# Patient Record
Sex: FEMALE | Race: WHITE | Employment: OTHER | URBAN - NONMETROPOLITAN AREA
[De-identification: names, ages, dates, MRNs, and addresses within clinical notes are randomized per-mention and may not be internally consistent; named-entity substitution may affect disease eponyms.]

---

## 2017-01-07 ENCOUNTER — OFFICE VISIT (OUTPATIENT)
Dept: FAMILY MEDICINE CLINIC | Age: 77
End: 2017-01-07

## 2017-01-07 VITALS
BODY MASS INDEX: 22.61 KG/M2 | DIASTOLIC BLOOD PRESSURE: 60 MMHG | HEART RATE: 78 BPM | SYSTOLIC BLOOD PRESSURE: 118 MMHG | WEIGHT: 127.6 LBS | OXYGEN SATURATION: 98 % | TEMPERATURE: 98.3 F | HEIGHT: 63 IN

## 2017-01-07 DIAGNOSIS — R51.9 ACUTE NONINTRACTABLE HEADACHE, UNSPECIFIED HEADACHE TYPE: ICD-10-CM

## 2017-01-07 DIAGNOSIS — Z00.00 WELLNESS EXAMINATION: ICD-10-CM

## 2017-01-07 DIAGNOSIS — R42 VERTIGO: ICD-10-CM

## 2017-01-07 DIAGNOSIS — R41.3 MEMORY LOSS: ICD-10-CM

## 2017-01-07 DIAGNOSIS — R47.89 WORD FINDING DIFFICULTY: ICD-10-CM

## 2017-01-07 DIAGNOSIS — J18.9 PNEUMONIA DUE TO INFECTIOUS ORGANISM, UNSPECIFIED LATERALITY, UNSPECIFIED PART OF LUNG: Primary | ICD-10-CM

## 2017-01-07 PROCEDURE — 99214 OFFICE O/P EST MOD 30 MIN: CPT | Performed by: FAMILY MEDICINE

## 2017-01-07 RX ORDER — DOXYCYCLINE HYCLATE 100 MG
100 TABLET ORAL 2 TIMES DAILY
Qty: 28 TABLET | Refills: 0 | Status: SHIPPED | OUTPATIENT
Start: 2017-01-07 | End: 2017-01-21

## 2017-01-07 ASSESSMENT — ENCOUNTER SYMPTOMS
SORE THROAT: 0
SHORTNESS OF BREATH: 0
RHINORRHEA: 1
COUGH: 1

## 2017-01-19 DIAGNOSIS — Z00.00 WELLNESS EXAMINATION: ICD-10-CM

## 2017-01-19 DIAGNOSIS — R41.3 MEMORY LOSS: ICD-10-CM

## 2017-01-19 LAB
ALT SERPL-CCNC: 11 U/L (ref 0–33)
ANION GAP SERPL CALCULATED.3IONS-SCNC: 14 MEQ/L (ref 7–13)
AST SERPL-CCNC: 20 U/L (ref 0–35)
BASOPHILS ABSOLUTE: 0.1 K/UL (ref 0–0.2)
BASOPHILS RELATIVE PERCENT: 1.4 %
BUN BLDV-MCNC: 14 MG/DL (ref 8–23)
CALCIUM SERPL-MCNC: 9.5 MG/DL (ref 8.6–10.2)
CHLORIDE BLD-SCNC: 100 MEQ/L (ref 98–107)
CHOLESTEROL, TOTAL: 186 MG/DL (ref 0–199)
CO2: 23 MEQ/L (ref 22–29)
CREAT SERPL-MCNC: 0.64 MG/DL (ref 0.5–0.9)
EOSINOPHILS ABSOLUTE: 0.1 K/UL (ref 0–0.7)
EOSINOPHILS RELATIVE PERCENT: 2.3 %
FOLATE: 16.3 NG/ML (ref 7.3–26.1)
GFR AFRICAN AMERICAN: >60
GFR NON-AFRICAN AMERICAN: >60
GLUCOSE BLD-MCNC: 93 MG/DL (ref 74–109)
HCT VFR BLD CALC: 38.3 % (ref 37–47)
HDLC SERPL-MCNC: 76 MG/DL (ref 40–59)
HEMOGLOBIN: 12.3 G/DL (ref 12–16)
LDL CHOLESTEROL CALCULATED: 91 MG/DL (ref 0–129)
LYMPHOCYTES ABSOLUTE: 1 K/UL (ref 1–4.8)
LYMPHOCYTES RELATIVE PERCENT: 20.3 %
MCH RBC QN AUTO: 28.8 PG (ref 27–31.3)
MCHC RBC AUTO-ENTMCNC: 32 % (ref 33–37)
MCV RBC AUTO: 90 FL (ref 82–100)
MONOCYTES ABSOLUTE: 0.7 K/UL (ref 0.2–0.8)
MONOCYTES RELATIVE PERCENT: 14.6 %
NEUTROPHILS ABSOLUTE: 3 K/UL (ref 1.4–6.5)
NEUTROPHILS RELATIVE PERCENT: 61.4 %
PDW BLD-RTO: 13.9 % (ref 11.5–14.5)
PLATELET # BLD: 292 K/UL (ref 130–400)
POTASSIUM SERPL-SCNC: 4.5 MEQ/L (ref 3.5–5.1)
RBC # BLD: 4.26 M/UL (ref 4.2–5.4)
SODIUM BLD-SCNC: 137 MEQ/L (ref 132–144)
TRIGL SERPL-MCNC: 94 MG/DL (ref 0–200)
TSH SERPL DL<=0.05 MIU/L-ACNC: 0.48 UIU/ML (ref 0.27–4.2)
VITAMIN B-12: 485 PG/ML (ref 211–946)
WBC # BLD: 4.9 K/UL (ref 4.8–10.8)

## 2017-01-20 ENCOUNTER — OFFICE VISIT (OUTPATIENT)
Dept: FAMILY MEDICINE CLINIC | Age: 77
End: 2017-01-20

## 2017-01-20 VITALS
DIASTOLIC BLOOD PRESSURE: 68 MMHG | HEIGHT: 63 IN | BODY MASS INDEX: 22.5 KG/M2 | SYSTOLIC BLOOD PRESSURE: 110 MMHG | HEART RATE: 73 BPM | OXYGEN SATURATION: 98 % | WEIGHT: 127 LBS

## 2017-01-20 DIAGNOSIS — M06.9 RHEUMATOID ARTHRITIS, INVOLVING UNSPECIFIED SITE, UNSPECIFIED RHEUMATOID FACTOR PRESENCE: ICD-10-CM

## 2017-01-20 DIAGNOSIS — I48.91 ATRIAL FIBRILLATION, UNSPECIFIED TYPE (HCC): ICD-10-CM

## 2017-01-20 DIAGNOSIS — E03.9 HYPOTHYROIDISM, UNSPECIFIED TYPE: Primary | ICD-10-CM

## 2017-01-20 LAB
RPR: NORMAL
T4 FREE: 1.09 NG/DL (ref 0.93–1.7)

## 2017-01-20 PROCEDURE — 1090F PRES/ABSN URINE INCON ASSESS: CPT | Performed by: FAMILY MEDICINE

## 2017-01-20 PROCEDURE — G8427 DOCREV CUR MEDS BY ELIG CLIN: HCPCS | Performed by: FAMILY MEDICINE

## 2017-01-20 PROCEDURE — G8484 FLU IMMUNIZE NO ADMIN: HCPCS | Performed by: FAMILY MEDICINE

## 2017-01-20 PROCEDURE — G8419 CALC BMI OUT NRM PARAM NOF/U: HCPCS | Performed by: FAMILY MEDICINE

## 2017-01-20 PROCEDURE — G8399 PT W/DXA RESULTS DOCUMENT: HCPCS | Performed by: FAMILY MEDICINE

## 2017-01-20 PROCEDURE — 1123F ACP DISCUSS/DSCN MKR DOCD: CPT | Performed by: FAMILY MEDICINE

## 2017-01-20 PROCEDURE — 1036F TOBACCO NON-USER: CPT | Performed by: FAMILY MEDICINE

## 2017-01-20 PROCEDURE — 99213 OFFICE O/P EST LOW 20 MIN: CPT | Performed by: FAMILY MEDICINE

## 2017-01-20 PROCEDURE — 4040F PNEUMOC VAC/ADMIN/RCVD: CPT | Performed by: FAMILY MEDICINE

## 2018-03-30 ENCOUNTER — OFFICE VISIT (OUTPATIENT)
Dept: FAMILY MEDICINE CLINIC | Age: 78
End: 2018-03-30
Payer: MEDICARE

## 2018-03-30 VITALS
HEART RATE: 69 BPM | WEIGHT: 127 LBS | BODY MASS INDEX: 22.5 KG/M2 | OXYGEN SATURATION: 96 % | HEIGHT: 63 IN | SYSTOLIC BLOOD PRESSURE: 116 MMHG | DIASTOLIC BLOOD PRESSURE: 74 MMHG

## 2018-03-30 DIAGNOSIS — M81.0 OSTEOPOROSIS, UNSPECIFIED OSTEOPOROSIS TYPE, UNSPECIFIED PATHOLOGICAL FRACTURE PRESENCE: ICD-10-CM

## 2018-03-30 DIAGNOSIS — E03.9 HYPOTHYROIDISM, UNSPECIFIED TYPE: ICD-10-CM

## 2018-03-30 DIAGNOSIS — M06.9 RHEUMATOID ARTHRITIS, INVOLVING UNSPECIFIED SITE, UNSPECIFIED RHEUMATOID FACTOR PRESENCE: Primary | ICD-10-CM

## 2018-03-30 DIAGNOSIS — I48.91 ATRIAL FIBRILLATION, UNSPECIFIED TYPE (HCC): ICD-10-CM

## 2018-03-30 DIAGNOSIS — Z12.31 ENCOUNTER FOR SCREENING MAMMOGRAM FOR MALIGNANT NEOPLASM OF BREAST: ICD-10-CM

## 2018-03-30 LAB
ALBUMIN SERPL-MCNC: 4.3 G/DL (ref 3.9–4.9)
ALP BLD-CCNC: 68 U/L (ref 40–130)
ALT SERPL-CCNC: 22 U/L (ref 0–33)
ANION GAP SERPL CALCULATED.3IONS-SCNC: 12 MEQ/L (ref 7–13)
AST SERPL-CCNC: 28 U/L (ref 0–35)
BILIRUB SERPL-MCNC: 0.7 MG/DL (ref 0–1.2)
BUN BLDV-MCNC: 12 MG/DL (ref 8–23)
CALCIUM SERPL-MCNC: 9.4 MG/DL (ref 8.6–10.2)
CHLORIDE BLD-SCNC: 100 MEQ/L (ref 98–107)
CHOLESTEROL, TOTAL: 197 MG/DL (ref 0–199)
CO2: 25 MEQ/L (ref 22–29)
CREAT SERPL-MCNC: 0.69 MG/DL (ref 0.5–0.9)
GFR AFRICAN AMERICAN: >60
GFR NON-AFRICAN AMERICAN: >60
GLOBULIN: 3.1 G/DL (ref 2.3–3.5)
GLUCOSE BLD-MCNC: 87 MG/DL (ref 74–109)
HCT VFR BLD CALC: 37.1 % (ref 37–47)
HDLC SERPL-MCNC: 75 MG/DL (ref 40–59)
HEMOGLOBIN: 12.3 G/DL (ref 12–16)
LDL CHOLESTEROL CALCULATED: 108 MG/DL (ref 0–129)
MCH RBC QN AUTO: 30.2 PG (ref 27–31.3)
MCHC RBC AUTO-ENTMCNC: 33 % (ref 33–37)
MCV RBC AUTO: 91.3 FL (ref 82–100)
PDW BLD-RTO: 14 % (ref 11.5–14.5)
PLATELET # BLD: 239 K/UL (ref 130–400)
POTASSIUM SERPL-SCNC: 4.3 MEQ/L (ref 3.5–5.1)
RBC # BLD: 4.06 M/UL (ref 4.2–5.4)
SODIUM BLD-SCNC: 137 MEQ/L (ref 132–144)
TOTAL PROTEIN: 7.4 G/DL (ref 6.4–8.1)
TRIGL SERPL-MCNC: 70 MG/DL (ref 0–200)
TSH SERPL DL<=0.05 MIU/L-ACNC: 2.12 UIU/ML (ref 0.27–4.2)
VITAMIN D 25-HYDROXY: 30.6 NG/ML (ref 30–100)
WBC # BLD: 5.5 K/UL (ref 4.8–10.8)

## 2018-03-30 PROCEDURE — 3288F FALL RISK ASSESSMENT DOCD: CPT | Performed by: FAMILY MEDICINE

## 2018-03-30 PROCEDURE — 1036F TOBACCO NON-USER: CPT | Performed by: FAMILY MEDICINE

## 2018-03-30 PROCEDURE — 4040F PNEUMOC VAC/ADMIN/RCVD: CPT | Performed by: FAMILY MEDICINE

## 2018-03-30 PROCEDURE — G8420 CALC BMI NORM PARAMETERS: HCPCS | Performed by: FAMILY MEDICINE

## 2018-03-30 PROCEDURE — 99213 OFFICE O/P EST LOW 20 MIN: CPT | Performed by: FAMILY MEDICINE

## 2018-03-30 PROCEDURE — G8510 SCR DEP NEG, NO PLAN REQD: HCPCS | Performed by: FAMILY MEDICINE

## 2018-03-30 PROCEDURE — G8399 PT W/DXA RESULTS DOCUMENT: HCPCS | Performed by: FAMILY MEDICINE

## 2018-03-30 PROCEDURE — 1123F ACP DISCUSS/DSCN MKR DOCD: CPT | Performed by: FAMILY MEDICINE

## 2018-03-30 PROCEDURE — 1090F PRES/ABSN URINE INCON ASSESS: CPT | Performed by: FAMILY MEDICINE

## 2018-03-30 PROCEDURE — G8484 FLU IMMUNIZE NO ADMIN: HCPCS | Performed by: FAMILY MEDICINE

## 2018-03-30 PROCEDURE — G8427 DOCREV CUR MEDS BY ELIG CLIN: HCPCS | Performed by: FAMILY MEDICINE

## 2018-03-30 ASSESSMENT — PATIENT HEALTH QUESTIONNAIRE - PHQ9
SUM OF ALL RESPONSES TO PHQ9 QUESTIONS 1 & 2: 0
SUM OF ALL RESPONSES TO PHQ QUESTIONS 1-9: 0
2. FEELING DOWN, DEPRESSED OR HOPELESS: 0
1. LITTLE INTEREST OR PLEASURE IN DOING THINGS: 0

## 2018-04-17 NOTE — PROGRESS NOTES
Left message for patient regarding the outstanding orders
Heart: RRR, S1S2, w/out M/R/G, non-displaced PMI   Abdomen: Soft NT/ND, w/out R/G, w/ +BSx4   Ext: trace to 1+ pedal edema bilaterally. Neuro: Neurovascularly intact w/ Sensory/Motor intact UE/LE Bilaterally. Psych: she makes good eye contact, but flat affect, quiet. A&P  1. Rheumatoid arthritis, involving unspecified site, unspecified rheumatoid factor presence ([facility] [address]     2. Hypothyroidism, unspecified type  Lipid Panel    TSH without Reflex   3. Atrial fibrillation, unspecified type ([address]  CBC    Lipid Panel    Comprehensive Metabolic Panel   4. Osteoporosis, unspecified osteoporosis type, unspecified pathological fracture presence  Vitamin D 25 Hydroxy    DEXA Bone Density Axial Skeleton   5. Encounter for screening mammogram for malignant neoplasm of breast  KATHLEEN DIGITAL SCREEN W CAD BILATERAL     Labs reviewed in detail     F/u 6 mos, sooner if any symptoms worsening. Reviewed [facility] note  Remains only on asa for PAF, but she remains in [facility]    Updated health maintenance.         Fortino Amaro MD

## 2018-06-07 ENCOUNTER — TELEPHONE (OUTPATIENT)
Dept: FAMILY MEDICINE CLINIC | Age: 78
End: 2018-06-07

## 2018-06-08 ENCOUNTER — OFFICE VISIT (OUTPATIENT)
Dept: FAMILY MEDICINE CLINIC | Age: 78
End: 2018-06-08
Payer: MEDICARE

## 2018-06-08 VITALS
SYSTOLIC BLOOD PRESSURE: 110 MMHG | HEIGHT: 63 IN | HEART RATE: 67 BPM | DIASTOLIC BLOOD PRESSURE: 78 MMHG | OXYGEN SATURATION: 98 %

## 2018-06-08 DIAGNOSIS — M06.9 RHEUMATOID ARTHRITIS, INVOLVING UNSPECIFIED SITE, UNSPECIFIED RHEUMATOID FACTOR PRESENCE: ICD-10-CM

## 2018-06-08 DIAGNOSIS — I48.91 ATRIAL FIBRILLATION, UNSPECIFIED TYPE (HCC): ICD-10-CM

## 2018-06-08 DIAGNOSIS — R41.3 MEMORY LOSS: Primary | ICD-10-CM

## 2018-06-08 DIAGNOSIS — S72.8X1A OTHER FRACTURE OF RIGHT FEMUR, INITIAL ENCOUNTER FOR CLOSED FRACTURE (HCC): ICD-10-CM

## 2018-06-08 DIAGNOSIS — F32.A DEPRESSION, UNSPECIFIED DEPRESSION TYPE: ICD-10-CM

## 2018-06-08 PROCEDURE — G8420 CALC BMI NORM PARAMETERS: HCPCS | Performed by: FAMILY MEDICINE

## 2018-06-08 PROCEDURE — G8399 PT W/DXA RESULTS DOCUMENT: HCPCS | Performed by: FAMILY MEDICINE

## 2018-06-08 PROCEDURE — 1090F PRES/ABSN URINE INCON ASSESS: CPT | Performed by: FAMILY MEDICINE

## 2018-06-08 PROCEDURE — 4040F PNEUMOC VAC/ADMIN/RCVD: CPT | Performed by: FAMILY MEDICINE

## 2018-06-08 PROCEDURE — 1123F ACP DISCUSS/DSCN MKR DOCD: CPT | Performed by: FAMILY MEDICINE

## 2018-06-08 PROCEDURE — 99213 OFFICE O/P EST LOW 20 MIN: CPT | Performed by: FAMILY MEDICINE

## 2018-06-08 PROCEDURE — G8427 DOCREV CUR MEDS BY ELIG CLIN: HCPCS | Performed by: FAMILY MEDICINE

## 2018-06-08 PROCEDURE — 1036F TOBACCO NON-USER: CPT | Performed by: FAMILY MEDICINE

## 2018-06-08 RX ORDER — METOPROLOL SUCCINATE 25 MG/1
25 TABLET, EXTENDED RELEASE ORAL
COMMUNITY
Start: 2018-06-04 | End: 2019-07-11

## 2018-06-08 RX ORDER — DONEPEZIL HYDROCHLORIDE 5 MG/1
5 TABLET, FILM COATED ORAL NIGHTLY
Qty: 30 TABLET | Refills: 2 | Status: SHIPPED | OUTPATIENT
Start: 2018-06-08 | End: 2018-07-06 | Stop reason: SDUPTHER

## 2018-06-12 ENCOUNTER — HOSPITAL ENCOUNTER (OUTPATIENT)
Dept: ORTHOPEDIC SURGERY | Age: 78
Discharge: HOME | End: 2018-06-14
Payer: MEDICARE

## 2018-06-12 ENCOUNTER — TELEPHONE (OUTPATIENT)
Dept: FAMILY MEDICINE CLINIC | Age: 78
End: 2018-06-12

## 2018-06-12 DIAGNOSIS — M25.551 PAIN IN JOINT INVOLVING RIGHT PELVIC REGION AND THIGH: ICD-10-CM

## 2018-06-12 PROCEDURE — 73502 X-RAY EXAM HIP UNI 2-3 VIEWS: CPT

## 2018-07-03 ENCOUNTER — HOSPITAL ENCOUNTER (OUTPATIENT)
Dept: ORTHOPEDIC SURGERY | Age: 78
Discharge: HOME | End: 2018-07-05
Payer: MEDICARE

## 2018-07-03 DIAGNOSIS — M25.551 PAIN IN JOINT INVOLVING RIGHT PELVIC REGION AND THIGH: ICD-10-CM

## 2018-07-03 PROCEDURE — 73502 X-RAY EXAM HIP UNI 2-3 VIEWS: CPT

## 2018-07-06 ENCOUNTER — OFFICE VISIT (OUTPATIENT)
Dept: FAMILY MEDICINE CLINIC | Age: 78
End: 2018-07-06
Payer: MEDICARE

## 2018-07-06 VITALS
OXYGEN SATURATION: 98 % | HEIGHT: 63 IN | BODY MASS INDEX: 23.57 KG/M2 | WEIGHT: 133 LBS | DIASTOLIC BLOOD PRESSURE: 78 MMHG | SYSTOLIC BLOOD PRESSURE: 124 MMHG | HEART RATE: 56 BPM

## 2018-07-06 DIAGNOSIS — M06.9 RHEUMATOID ARTHRITIS, INVOLVING UNSPECIFIED SITE, UNSPECIFIED RHEUMATOID FACTOR PRESENCE: ICD-10-CM

## 2018-07-06 DIAGNOSIS — F32.A DEPRESSION, UNSPECIFIED DEPRESSION TYPE: ICD-10-CM

## 2018-07-06 DIAGNOSIS — I48.91 ATRIAL FIBRILLATION, UNSPECIFIED TYPE (HCC): ICD-10-CM

## 2018-07-06 DIAGNOSIS — R41.3 MEMORY LOSS: Primary | ICD-10-CM

## 2018-07-06 PROCEDURE — 1090F PRES/ABSN URINE INCON ASSESS: CPT | Performed by: FAMILY MEDICINE

## 2018-07-06 PROCEDURE — 1123F ACP DISCUSS/DSCN MKR DOCD: CPT | Performed by: FAMILY MEDICINE

## 2018-07-06 PROCEDURE — G8427 DOCREV CUR MEDS BY ELIG CLIN: HCPCS | Performed by: FAMILY MEDICINE

## 2018-07-06 PROCEDURE — 1036F TOBACCO NON-USER: CPT | Performed by: FAMILY MEDICINE

## 2018-07-06 PROCEDURE — G8420 CALC BMI NORM PARAMETERS: HCPCS | Performed by: FAMILY MEDICINE

## 2018-07-06 PROCEDURE — 99212 OFFICE O/P EST SF 10 MIN: CPT | Performed by: FAMILY MEDICINE

## 2018-07-06 PROCEDURE — 4040F PNEUMOC VAC/ADMIN/RCVD: CPT | Performed by: FAMILY MEDICINE

## 2018-07-06 PROCEDURE — G8399 PT W/DXA RESULTS DOCUMENT: HCPCS | Performed by: FAMILY MEDICINE

## 2018-07-06 RX ORDER — DONEPEZIL HYDROCHLORIDE 10 MG/1
10 TABLET, FILM COATED ORAL NIGHTLY
Qty: 30 TABLET | Refills: 5 | Status: SHIPPED | OUTPATIENT
Start: 2018-07-06 | End: 2019-01-11 | Stop reason: SINTOL

## 2018-07-06 NOTE — PROGRESS NOTES
Chief Complaint   Patient presents with    Medication Check     aricept     Alexa Milligan is a [de-identified] y.o. female    F/u aricept  Seems to be helping  No negative side effecs    s/p ORIF  2 weeks in rehab hospital  Home now since 6/4  home care nurse, OT, PT          Patient Active Problem List   Diagnosis    Hypothyroidism    RA (rheumatoid arthritis) ([facility] [address]    History of shingles    Hearing loss    Dependent edema    Post-nasal drip    Weight loss    Depression    Poor appetite    A-fib ([facility] [address]    Osteoporosis       has a current medication list which includes the following prescription(s): donepezil, metoprolol succinate, aspirin, and flecainide. Patient's medications, allergies, past medical, surgical, social and family histories were reviewed and updated as appropriate. Review of Systems:   General/Endocrine ROS: fatigue, dry hair and skin, hair loss  Psych: see HPI  Respiratory ROS: no cough, shortness of breath, or wheezing  Cardiovascular ROS: no chest pain or dyspnea on exertion  Gastrointestinal ROS: no abdominal pain, change in bowel habits, or black or bloody stools  Genito-Urinary ROS: no dysuria, trouble voiding  Musculoskeletal ROS:  Recent hip fracture, hx RA on no DMARD,   DENIES PAIN  Neurological ROS:memory loss    In general patient otherwise reports feeling well. Physical Exam:  /78 (Site: Left Arm)   Pulse 56   Ht 5' 3\" (1.6 m)   Wt 133 lb (60.3 kg)   SpO2 98%   Breastfeeding? No   BMI 23.56 kg/m²     Gen: thin, but well appearing, NAD, Alert, Oriented x 3   HEENT: EOMI, eyes clear, MMM  Skin: without rash or jaundice  Neuro: Neurovascularly intact w/ Sensory/Motor intact UE/LE Bilaterally. Psych: she makes good eye contact, but flat affect, quiet. A&P   Diagnosis Orders   1. Memory loss  donepezil (ARICEPT) 10 MG tablet   2. Rheumatoid arthritis, involving unspecified site, unspecified rheumatoid factor presence ([facility] [address]     3.  Atrial fibrillation,

## 2018-08-03 ENCOUNTER — HOSPITAL ENCOUNTER (OUTPATIENT)
Dept: ORTHOPEDIC SURGERY | Age: 78
Discharge: HOME | End: 2018-08-05
Payer: MEDICARE

## 2018-08-03 DIAGNOSIS — S72.001D CLOSED FRACTURE OF NECK OF RIGHT FEMUR WITH ROUTINE HEALING, SUBSEQUENT ENCOUNTER: ICD-10-CM

## 2018-08-03 PROCEDURE — 73502 X-RAY EXAM HIP UNI 2-3 VIEWS: CPT

## 2018-09-26 ENCOUNTER — TELEPHONE (OUTPATIENT)
Dept: FAMILY MEDICINE CLINIC | Age: 78
End: 2018-09-26

## 2018-09-27 ENCOUNTER — TELEPHONE (OUTPATIENT)
Dept: FAMILY MEDICINE CLINIC | Age: 78
End: 2018-09-27

## 2018-09-27 NOTE — TELEPHONE ENCOUNTER
Virgilio from [address] [place], [place] [address]y [address]; Shahnaz Erickson is not available. Please call him back.  [phone]

## 2018-09-27 NOTE — TELEPHONE ENCOUNTER
Warden Bang states does not have titration pack, per Dr. Deidra Leung 7 mg x 2 weeks then 14 mg x 2 week, then follow up and 14mg until pt able to come in.   Virgilio and pt aware

## 2018-09-27 NOTE — TELEPHONE ENCOUNTER
Orders Placed This Encounter   Medications    Memantine HCl ER (NAMENDA XR TITRATION PACK) 7 & 14 & 21 &28 MG CP24     Sig: Take as directed     Dispense:  30 capsule     Refill:  5       The above med(s) were e-scripted to the patient's pharmacy.    Please advise patient  José Cotton MD

## 2018-10-29 RX ORDER — MEMANTINE HYDROCHLORIDE 14 MG/1
CAPSULE, EXTENDED RELEASE ORAL
Qty: 30 CAPSULE | Refills: 0 | Status: SHIPPED | OUTPATIENT
Start: 2018-10-29 | End: 2018-11-27 | Stop reason: SDUPTHER

## 2019-01-11 ENCOUNTER — OFFICE VISIT (OUTPATIENT)
Dept: FAMILY MEDICINE CLINIC | Age: 79
End: 2019-01-11
Payer: MEDICARE

## 2019-01-11 VITALS
WEIGHT: 138 LBS | HEART RATE: 64 BPM | DIASTOLIC BLOOD PRESSURE: 60 MMHG | BODY MASS INDEX: 24.45 KG/M2 | HEIGHT: 63 IN | OXYGEN SATURATION: 99 % | SYSTOLIC BLOOD PRESSURE: 110 MMHG

## 2019-01-11 DIAGNOSIS — M06.9 RHEUMATOID ARTHRITIS, INVOLVING UNSPECIFIED SITE, UNSPECIFIED RHEUMATOID FACTOR PRESENCE: ICD-10-CM

## 2019-01-11 DIAGNOSIS — E03.9 HYPOTHYROIDISM, UNSPECIFIED TYPE: ICD-10-CM

## 2019-01-11 DIAGNOSIS — I48.91 ATRIAL FIBRILLATION, UNSPECIFIED TYPE (HCC): Primary | ICD-10-CM

## 2019-01-11 DIAGNOSIS — M81.0 OSTEOPOROSIS, UNSPECIFIED OSTEOPOROSIS TYPE, UNSPECIFIED PATHOLOGICAL FRACTURE PRESENCE: ICD-10-CM

## 2019-01-11 DIAGNOSIS — Z12.39 SCREENING FOR MALIGNANT NEOPLASM OF BREAST: ICD-10-CM

## 2019-01-11 DIAGNOSIS — F32.A DEPRESSION, UNSPECIFIED DEPRESSION TYPE: ICD-10-CM

## 2019-01-11 DIAGNOSIS — R41.3 MEMORY LOSS: ICD-10-CM

## 2019-01-11 PROCEDURE — 1123F ACP DISCUSS/DSCN MKR DOCD: CPT | Performed by: FAMILY MEDICINE

## 2019-01-11 PROCEDURE — 1036F TOBACCO NON-USER: CPT | Performed by: FAMILY MEDICINE

## 2019-01-11 PROCEDURE — 99213 OFFICE O/P EST LOW 20 MIN: CPT | Performed by: FAMILY MEDICINE

## 2019-01-11 PROCEDURE — G8399 PT W/DXA RESULTS DOCUMENT: HCPCS | Performed by: FAMILY MEDICINE

## 2019-01-11 PROCEDURE — 4040F PNEUMOC VAC/ADMIN/RCVD: CPT | Performed by: FAMILY MEDICINE

## 2019-01-11 PROCEDURE — G8482 FLU IMMUNIZE ORDER/ADMIN: HCPCS | Performed by: FAMILY MEDICINE

## 2019-01-11 PROCEDURE — 1101F PT FALLS ASSESS-DOCD LE1/YR: CPT | Performed by: FAMILY MEDICINE

## 2019-01-11 PROCEDURE — G8420 CALC BMI NORM PARAMETERS: HCPCS | Performed by: FAMILY MEDICINE

## 2019-01-11 PROCEDURE — 1090F PRES/ABSN URINE INCON ASSESS: CPT | Performed by: FAMILY MEDICINE

## 2019-01-11 PROCEDURE — G8427 DOCREV CUR MEDS BY ELIG CLIN: HCPCS | Performed by: FAMILY MEDICINE

## 2019-01-11 RX ORDER — LANOLIN ALCOHOL/MO/W.PET/CERES
1000 CREAM (GRAM) TOPICAL DAILY
COMMUNITY
End: 2021-01-01

## 2019-01-11 RX ORDER — PSEUDOEPHEDRINE HCL 30 MG
100 TABLET ORAL
COMMUNITY
Start: 2018-06-04 | End: 2019-07-11 | Stop reason: ALTCHOICE

## 2019-01-11 RX ORDER — B-COMPLEX WITH VITAMIN C
1 TABLET ORAL
COMMUNITY
Start: 2018-06-04

## 2019-02-08 DIAGNOSIS — M81.0 OSTEOPOROSIS, UNSPECIFIED OSTEOPOROSIS TYPE, UNSPECIFIED PATHOLOGICAL FRACTURE PRESENCE: ICD-10-CM

## 2019-02-08 DIAGNOSIS — E03.9 HYPOTHYROIDISM, UNSPECIFIED TYPE: ICD-10-CM

## 2019-02-08 DIAGNOSIS — I48.91 ATRIAL FIBRILLATION, UNSPECIFIED TYPE (HCC): ICD-10-CM

## 2019-02-08 LAB
ALBUMIN SERPL-MCNC: 4.4 G/DL (ref 3.5–4.6)
ALP BLD-CCNC: 66 U/L (ref 40–130)
ALT SERPL-CCNC: 16 U/L (ref 0–33)
ANION GAP SERPL CALCULATED.3IONS-SCNC: 14 MEQ/L (ref 9–15)
AST SERPL-CCNC: 22 U/L (ref 0–35)
BILIRUB SERPL-MCNC: 0.8 MG/DL (ref 0.2–0.7)
BUN BLDV-MCNC: 19 MG/DL (ref 8–23)
CALCIUM SERPL-MCNC: 9.8 MG/DL (ref 8.5–9.9)
CHLORIDE BLD-SCNC: 101 MEQ/L (ref 95–107)
CHOLESTEROL, TOTAL: 189 MG/DL (ref 0–199)
CO2: 26 MEQ/L (ref 20–31)
CREAT SERPL-MCNC: 0.74 MG/DL (ref 0.5–0.9)
GFR AFRICAN AMERICAN: >60
GFR NON-AFRICAN AMERICAN: >60
GLOBULIN: 3.5 G/DL (ref 2.3–3.5)
GLUCOSE BLD-MCNC: 72 MG/DL (ref 70–99)
HCT VFR BLD CALC: 38.3 % (ref 37–47)
HDLC SERPL-MCNC: 66 MG/DL (ref 40–59)
HEMOGLOBIN: 12.9 G/DL (ref 12–16)
LDL CHOLESTEROL CALCULATED: 108 MG/DL (ref 0–129)
MCH RBC QN AUTO: 30.6 PG (ref 27–31.3)
MCHC RBC AUTO-ENTMCNC: 33.7 % (ref 33–37)
MCV RBC AUTO: 90.8 FL (ref 82–100)
PDW BLD-RTO: 13.5 % (ref 11.5–14.5)
PLATELET # BLD: 250 K/UL (ref 130–400)
POTASSIUM SERPL-SCNC: 4.7 MEQ/L (ref 3.4–4.9)
RBC # BLD: 4.22 M/UL (ref 4.2–5.4)
SODIUM BLD-SCNC: 141 MEQ/L (ref 135–144)
TOTAL PROTEIN: 7.9 G/DL (ref 6.3–8)
TRIGL SERPL-MCNC: 74 MG/DL (ref 0–150)
TSH SERPL DL<=0.05 MIU/L-ACNC: 1.5 UIU/ML (ref 0.44–3.86)
VITAMIN D 25-HYDROXY: 28.9 NG/ML (ref 30–100)
WBC # BLD: 4.9 K/UL (ref 4.8–10.8)

## 2019-05-10 ENCOUNTER — APPOINTMENT (OUTPATIENT)
Dept: CT IMAGING | Age: 79
End: 2019-05-10
Payer: MEDICARE

## 2019-05-10 ENCOUNTER — APPOINTMENT (OUTPATIENT)
Dept: GENERAL RADIOLOGY | Age: 79
End: 2019-05-10
Payer: MEDICARE

## 2019-05-10 ENCOUNTER — HOSPITAL ENCOUNTER (EMERGENCY)
Age: 79
Discharge: HOME | End: 2019-05-10
Payer: MEDICARE

## 2019-05-10 VITALS
HEART RATE: 73 BPM | SYSTOLIC BLOOD PRESSURE: 121 MMHG | RESPIRATION RATE: 18 BRPM | OXYGEN SATURATION: 98 % | DIASTOLIC BLOOD PRESSURE: 54 MMHG | BODY MASS INDEX: 24.8 KG/M2 | TEMPERATURE: 98.1 F | WEIGHT: 140 LBS

## 2019-05-10 DIAGNOSIS — S01.511A LIP LACERATION, INITIAL ENCOUNTER: ICD-10-CM

## 2019-05-10 DIAGNOSIS — S09.90XA INJURY OF HEAD, INITIAL ENCOUNTER: Primary | ICD-10-CM
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
## 2019-05-10 PROCEDURE — 12011 RPR F/E/E/N/L/M 2.5 CM/<: CPT

## 2019-05-10 PROCEDURE — 72125 CT NECK SPINE W/O DYE: CPT

## 2019-05-10 PROCEDURE — 70450 CT HEAD/BRAIN W/O DYE: CPT

## 2019-05-10 PROCEDURE — 99283 EMERGENCY DEPT VISIT LOW MDM: CPT

## 2019-05-10 PROCEDURE — 73130 X-RAY EXAM OF HAND: CPT

## 2019-05-10 ASSESSMENT — PAIN DESCRIPTION - PAIN TYPE
TYPE: ACUTE PAIN
TYPE: ACUTE PAIN

## 2019-05-10 ASSESSMENT — PAIN DESCRIPTION - LOCATION: LOCATION: FACE

## 2019-05-10 ASSESSMENT — PAIN SCALES - GENERAL
PAINLEVEL_OUTOF10: 5
PAINLEVEL_OUTOF10: 5

## 2019-05-10 ASSESSMENT — PAIN DESCRIPTION - FREQUENCY: FREQUENCY: CONTINUOUS

## 2019-05-10 ASSESSMENT — PAIN DESCRIPTION - DESCRIPTORS: DESCRIPTORS: ACHING;SORE

## 2019-05-11 NOTE — ED PROVIDER NOTES
[facility] ED  eMERGENCY dEPARTMENT eNCOUnter      Pt Name: Ronney Riedel  MRN: [id number]  Christianogffavian 1940  Date of evaluation: 5/10/2019  Provider: RAJESH Lorenzo CNP     CHIEF COMPLAINT       Chief Complaint   Patient presents with    Fall       HISTORYOF PRESENT ILLNESS   (Location/Symptom, Timing/Onset, Context/Setting, Quality, Duration, ModifyingFactors, Severity) Note limiting factors. VALORIE Delgado is a 78year old female patient who presents to the ER with complaints of right hand pain and laceration to the upper lip after tripping and falling prior to arrival. [facility] denies hitting her head or LOC. She describes the hand pain as an intermittent aching with no pain radiation. Notes pain is worse with movement and palpation. She denies any chest pain, shortness of breath, vision changes, lightheadeness, dizziness, N/V.     Nursing Notes werereviewed. REVIEW OF SYSTEMS    (2+ for level 4; 10+ for level 5)     Review of Systems   Constitutional: Negative for appetite change and fever. HENT: Negative for drooling, ear pain, sore throat, trouble swallowing and voice change. Respiratory: Negative for cough and shortness of breath. Cardiovascular: Negative for chest pain. Gastrointestinal: Negative for abdominal pain, constipation, diarrhea, nausea and vomiting. Genitourinary: Negative for decreased urine volume and dysuria. Musculoskeletal: Positive for arthralgias (right hand). Negative for back pain. Skin: Positive for wound (laceration to the upper inner lip). Negative for color change. Neurological: Negative for dizziness, weakness, light-headedness and headaches. Psychiatric/Behavioral: Negative for agitation and behavioral problems. All other systems reviewed and are negative. Except as noted above the remainder of the review of systems was reviewed and negative.      PAST MEDICAL HISTORY     Past Medical History:   Diagnosis Date    Dependent edema     Hearing loss     wears hearing aids    History of shingles     Hypothyroidism     Memory loss 1/11/2019    RA (rheumatoid arthritis) ([address]     really is asymptomatic, not on any DMARDS       SURGICAL HISTORY        Past Surgical History:   Procedure Laterality Date    ABLATION OF DYSRHYTHMIC FOCUS  1997    CARPAL TUNNEL RELEASE      R    CATARACT REMOVAL WITH IMPLANT  2011    bilateral    COLONOSCOPY  2004   [address]       CURRENT MEDICATIONS       Discharge Medication List as of 5/10/2019 10:01 PM      CONTINUE these medications which have NOT CHANGED    Details   Calcium Carbonate-Vitamin D (OYSTER SHELL CALCIUM/D) 500-200 MG-UNIT TABS Take 1 tablet by mouthHistorical Med      docusate (COLACE, DULCOLAX) 100 MG CAPS Take 100 mg by mouthHistorical Med      vitamin B-12 (CYANOCOBALAMIN) 1000 MCG tablet Take 1,000 mcg by mouth dailyHistorical Med      metoprolol succinate (TOPROL XL) 25 MG extended release tablet Take 25 mg by mouthHistorical Med      aspirin 325 MG tablet Take 1 tablet by mouth daily. , Disp-30 tablet      flecainide (TAMBOCOR) 50 MG tablet Historical Med             ALLERGIES     Bupivacaine hcl; Erythromycin; Histamine; Percocet [oxycodone-acetaminophen]; Procaine hcl; and Vicodin [hydrocodone-acetaminophen]    FAMILY HISTORY       Family History   Problem Relation Age of Onset    Dementia Mother     Thyroid Disease Mother     Arthritis Father           SOCIAL HISTORY       Social History     Socioeconomic History    Marital status:       Spouse name: None    Number of children: None    Years of education: None    Highest education level: None   Occupational History    None   Social Needs    Financial resource strain: None    Food insecurity:     Worry: None     Inability: None    Transportation needs:     Medical: None     Non-medical: None   Tobacco Use    Smoking status: Former Smoker     Packs/day: 0.25     Years: 6.00     Pack years: 1.50     Types: Cigarettes     Last attempt to quit: 11/10/1973     Years since quittin.5    Smokeless tobacco: Never Used   Substance and Sexual Activity    Alcohol use: Yes     Comment: rare    Drug use: No    Sexual activity: None   Lifestyle    Physical activity:     Days per week: None     Minutes per session: None    Stress: None   Relationships    Social connections:     Talks on phone: None     Gets together: None     Attends [employer] service: None     Active member of club or organization: None     Attends meetings of clubs or organizations: None     Relationship status: None    Intimate partner violence:     Fear of current or ex partner: None     Emotionally abused: None     Physically abused: None     Forced sexual activity: None   Other Topics Concern    None   Social History Narrative    None       SCREENINGS    Ricky Coma Scale  Eye Opening: Spontaneous  Best Verbal Response: Oriented  Best Motor Response: Obeys commands  Ricky Coma Scale Score: 15      PHYSICAL EXAM    (up to 7 for level 4, 8 or more for level 5)     ED Triage Vitals   BP Temp Temp Source Pulse Resp SpO2 Height Weight   05/10/19 2024 05/10/19 2024 05/10/19 2024 05/10/19 2024 05/10/19 2024 05/10/19 2024 -- 05/10/19 2021   118/60 97.9 °F (36.6 °C) Oral 76 18 97 %  140 lb (63.5 kg)       Physical Exam   Constitutional: She is oriented to person, place, and time. She appears well-developed and well-nourished. No distress. HENT:   Head: Normocephalic and atraumatic. Eyes: Conjunctivae are normal. Right eye exhibits no discharge. Left eye exhibits no discharge. Neck: Normal range of motion. Neck supple. Cardiovascular: Normal rate and regular rhythm. Pulmonary/Chest: Effort normal and breath sounds normal.   Abdominal: Soft. Bowel sounds are normal.   Musculoskeletal: Normal range of motion. Arms:  Neurological: She is alert and oriented to person, place, and time. Skin: Skin is warm and dry. Psychiatric: She has a normal mood and affect. Nursing note and vitals reviewed. DIAGNOSTIC RESULTS     EKG: All EKG's are interpreted by the EmergencyDepartment Physician who either signs or Co-signs this chart in the absence of a cardiologist.        RADIOLOGY:   Non-plain film images such as CT, Ultrasound and MRI are read by the radiologist. Plain radiographic images are visualized and preliminarily interpreted by the emergency physician with the below findings:    Preliminary ct head shows no ICH. No acute fracture or dislocation for the right hand. Interpretation per the Radiologist below (ifavailable at the time of note entry):    XR HAND RIGHT (MIN 3 VIEWS)   Final Result      No acute osseous abnormality. CT HEAD WO CONTRAST   Final Result      No acute intracranial process. Air-fluid level within the left maxillary sinus likely relates to sinusitis and less likely blood product from fracture of a portion of the maxillary sinus that is not included on this examination. Correlation recommended. Generalized parenchymal volume loss and nonspecific white matter findings most compatible with chronic small vessel ischemic changes in a patient of this age. All CT scans at this facility use dose modulation, iterative reconstruction, and/or weight based dosing when appropriate to reduce radiation dose to as low as reasonably achievable. CT CERVICAL SPINE WO CONTRAST   Final Result      Degenerative changes of the cervical spine without acute fracture or malalignment. All CT scans at this facility use dose modulation, iterative reconstruction, and/or weight based dosing when appropriate to reduce radiation dose to as low as reasonably achievable. LABS:  Labs Reviewed - No data to display    All other labs were within normal range or not returned as of this dictation.     EMERGENCY DEPARTMENT COURSE and DIFFERENTIAL DIAGNOSIS/MDM:   Vitals:    Vitals: 05/10/19 2024 05/10/19 2102 05/10/19 2151 05/10/19 2154   BP: 118/60 118/60  (!) 121/54   Pulse: 76 76  73   Resp: 18 18 18 18   Temp: 97.9 °F (36.6 °C) 97.9 °F (36.6 °C) 98.1 °F (36.7 °C) 98.1 °F (36.7 °C)   TempSrc: Oral Oral     SpO2: 97% 97%  98%   Weight:  140 lb (63.5 kg)         MDM    Patient presents to the ER with the above noted complaint. I have obtained ct scan of head and neck to rule out acute process and preliminary readings are unremarkable. The patient's laceration repaired per procedure note. SHe will be discharged home in stable condition. I have provided her and her family per her request with anticipatory guidance and have instructed her on follow up and to return if not better or any new or worsening symptoms. CRITICAL CARE TIME     Total Critical Care time(not applicable if blank)    Total minutes, excluding separately reportable procedures. There was a high probability of clinically significant/life threatening deterioration in the patient's condition which required my urgent intervention. This includesdiscussing the case with consultants, reviewing laboratory studies and images independently, arranging disposition, and speaking with patient/family    CONSULTS:  None    PROCEDURES:  Unless otherwise notedbelow, none     Lac Repair  Date/Time: 5/10/2019 9:16 PM  Performed by: Mari Aguilar DO  Authorized by: Mari Aguilar DO     Consent:     Consent obtained:  Verbal    Consent given by:  Patient    Risks discussed:  Infection, pain, poor cosmetic result and poor wound healing    Alternatives discussed:  No treatment  Anesthesia (see MAR for exact dosages):      Anesthesia method:  None  Laceration details:     Location:  Lip    Length (cm):  1.5    Depth (mm):  1.5  Repair type:     Repair type:  Simple  Pre-procedure details:     Preparation:  Patient was prepped and draped in usual sterile fashion  Exploration:     Hemostasis achieved with:  Direct pressure    Wound exploration: wound explored through full range of motion      Wound extent: no fascia violation noted, no foreign bodies/material noted and no muscle damage noted      Contaminated: no    Treatment:     Area cleansed with:  Saline    Amount of cleaning:  Standard    Irrigation solution:  Sterile saline    Irrigation volume:  50cc    Irrigation method:  Syringe    Visualized foreign bodies/material removed: no    Skin repair:     Repair method:  Sutures    Suture size:  5-0    Suture material:  Fast-absorbing gut    Suture technique:  Simple interrupted    Number of sutures:  3  Approximation:     Approximation:  Close  Post-procedure details:     Dressing:  Open (no dressing)    Patient tolerance of procedure: Tolerated well, no immediate complications  Comments:      Suture used was vicryl rapide; this wasn't a choice above        FINAL IMPRESSION     1. Injury of head, initial encounter    2. Fall, initial encounter    3.  Lip laceration, initial encounter          DISPOSITION/PLAN   DISPOSITION Decision To Discharge 05/10/2019 10:01:20 PM      PATIENT REFERRED TO:  Una Gonzalez MD  [address] [place] [id number]    In 3 days  As needed      DISCHARGE MEDICATIONS:  Discharge Medication List as of 5/10/2019 10:01 PM             (Please note that portions of this note were completed with a voice recognition program.  Efforts were [facility] edit the dictations but occasionally words and phrases are mis-transcribed.)    RAJESH Macario CNP  (electronically signed)                 RAJESH Long CNP  05/13/19 [id number]

## 2019-05-11 NOTE — ED NOTES
Patient returned from [address]  05/10/19 1951

## 2019-05-11 NOTE — ED TRIAGE NOTES
Pt presents with c/o fall. Onset approx 1/2hr. Pt reports tripping on uneven sidewalk. Denies LOC. Pt a&o x4. resp even. Skin p/w/d. Abrasions noted to face, nose, bl hands. All neuro checks intact. Edema, bruising noted to first digit R hand. Active rom, strong radial pulse. Pt reports daily ASA use.  Denies n/v.

## 2019-05-11 NOTE — ED NOTES
Right wrist and hand wrapped with ace wrap. Patient tolerated well.      Jimena Guerra RN  05/10/19 7736

## 2019-05-11 NOTE — ED NOTES
Discharge instructions given to patient and family. Verbalized understanding. Patient ambulated out of ED with a steady gait.       Luther Luna RN  05/10/19 1481

## 2019-05-13 ASSESSMENT — ENCOUNTER SYMPTOMS
CONSTIPATION: 0
SORE THROAT: 0
VOICE CHANGE: 0
NAUSEA: 0
SHORTNESS OF BREATH: 0
BACK PAIN: 0
ABDOMINAL PAIN: 0
COUGH: 0
COLOR CHANGE: 0
VOMITING: 0
TROUBLE SWALLOWING: 0
DIARRHEA: 0

## 2019-05-15 ENCOUNTER — OFFICE VISIT (OUTPATIENT)
Dept: FAMILY MEDICINE CLINIC | Age: 79
End: 2019-05-15
Payer: MEDICARE

## 2019-05-15 VITALS
OXYGEN SATURATION: 98 % | DIASTOLIC BLOOD PRESSURE: 72 MMHG | HEIGHT: 63 IN | SYSTOLIC BLOOD PRESSURE: 110 MMHG | BODY MASS INDEX: 24.63 KG/M2 | HEART RATE: 60 BPM | WEIGHT: 139 LBS

## 2019-05-15 DIAGNOSIS — F32.A DEPRESSION, UNSPECIFIED DEPRESSION TYPE: ICD-10-CM

## 2019-05-15 DIAGNOSIS — S01.511A LIP LACERATION, INITIAL ENCOUNTER: Primary | ICD-10-CM

## 2019-05-15 DIAGNOSIS — Z91.81 AT HIGH RISK FOR FALLS: ICD-10-CM

## 2019-05-15 PROCEDURE — 1036F TOBACCO NON-USER: CPT | Performed by: FAMILY MEDICINE

## 2019-05-15 PROCEDURE — G8399 PT W/DXA RESULTS DOCUMENT: HCPCS | Performed by: FAMILY MEDICINE

## 2019-05-15 PROCEDURE — G8420 CALC BMI NORM PARAMETERS: HCPCS | Performed by: FAMILY MEDICINE

## 2019-05-15 PROCEDURE — G8427 DOCREV CUR MEDS BY ELIG CLIN: HCPCS | Performed by: FAMILY MEDICINE

## 2019-05-15 PROCEDURE — 99213 OFFICE O/P EST LOW 20 MIN: CPT | Performed by: FAMILY MEDICINE

## 2019-05-15 PROCEDURE — 1090F PRES/ABSN URINE INCON ASSESS: CPT | Performed by: FAMILY MEDICINE

## 2019-05-15 PROCEDURE — 4040F PNEUMOC VAC/ADMIN/RCVD: CPT | Performed by: FAMILY MEDICINE

## 2019-05-15 PROCEDURE — 1123F ACP DISCUSS/DSCN MKR DOCD: CPT | Performed by: FAMILY MEDICINE

## 2019-06-28 ENCOUNTER — HOSPITAL ENCOUNTER (OUTPATIENT)
Dept: WOMENS IMAGING | Age: 79
Discharge: HOME | End: 2019-06-30
Payer: MEDICARE

## 2019-06-28 DIAGNOSIS — M81.0 OSTEOPOROSIS, UNSPECIFIED OSTEOPOROSIS TYPE, UNSPECIFIED PATHOLOGICAL FRACTURE PRESENCE: ICD-10-CM

## 2019-06-28 PROCEDURE — 77080 DXA BONE DENSITY AXIAL: CPT

## 2019-07-11 ENCOUNTER — OFFICE VISIT (OUTPATIENT)
Dept: FAMILY MEDICINE CLINIC | Age: 79
End: 2019-07-11
Payer: MEDICARE

## 2019-07-11 VITALS
WEIGHT: 134.6 LBS | BODY MASS INDEX: 23.85 KG/M2 | SYSTOLIC BLOOD PRESSURE: 104 MMHG | DIASTOLIC BLOOD PRESSURE: 64 MMHG | HEIGHT: 63 IN | OXYGEN SATURATION: 98 % | HEART RATE: 69 BPM

## 2019-07-11 DIAGNOSIS — S63.641A SPRAIN OF METACARPOPHALANGEAL (MCP) JOINT OF RIGHT THUMB, INITIAL ENCOUNTER: ICD-10-CM

## 2019-07-11 DIAGNOSIS — M81.0 OSTEOPOROSIS, UNSPECIFIED OSTEOPOROSIS TYPE, UNSPECIFIED PATHOLOGICAL FRACTURE PRESENCE: Primary | ICD-10-CM

## 2019-07-11 DIAGNOSIS — F32.A DEPRESSION, UNSPECIFIED DEPRESSION TYPE: ICD-10-CM

## 2019-07-11 PROCEDURE — 1123F ACP DISCUSS/DSCN MKR DOCD: CPT | Performed by: FAMILY MEDICINE

## 2019-07-11 PROCEDURE — 99213 OFFICE O/P EST LOW 20 MIN: CPT | Performed by: FAMILY MEDICINE

## 2019-07-11 PROCEDURE — 1036F TOBACCO NON-USER: CPT | Performed by: FAMILY MEDICINE

## 2019-07-11 PROCEDURE — 4040F PNEUMOC VAC/ADMIN/RCVD: CPT | Performed by: FAMILY MEDICINE

## 2019-07-11 PROCEDURE — G8420 CALC BMI NORM PARAMETERS: HCPCS | Performed by: FAMILY MEDICINE

## 2019-07-11 PROCEDURE — G8399 PT W/DXA RESULTS DOCUMENT: HCPCS | Performed by: FAMILY MEDICINE

## 2019-07-11 PROCEDURE — G8427 DOCREV CUR MEDS BY ELIG CLIN: HCPCS | Performed by: FAMILY MEDICINE

## 2019-07-11 PROCEDURE — 1090F PRES/ABSN URINE INCON ASSESS: CPT | Performed by: FAMILY MEDICINE

## 2019-07-16 PROBLEM — M81.0 SENILE OSTEOPOROSIS: Status: ACTIVE | Noted: 2019-07-16

## 2019-07-17 ENCOUNTER — HOSPITAL ENCOUNTER (OUTPATIENT)
Dept: INFUSION THERAPY | Age: 79
Setting detail: INFUSION SERIES
Discharge: HOME | End: 2019-07-17
Payer: MEDICARE

## 2019-07-17 VITALS
SYSTOLIC BLOOD PRESSURE: 124 MMHG | HEART RATE: 59 BPM | TEMPERATURE: 97.5 F | DIASTOLIC BLOOD PRESSURE: 67 MMHG | RESPIRATION RATE: 16 BRPM

## 2019-07-17 DIAGNOSIS — M81.0 SENILE OSTEOPOROSIS: Primary | ICD-10-CM

## 2019-07-17 PROCEDURE — 96401 CHEMO ANTI-NEOPL SQ/IM: CPT

## 2019-07-17 PROCEDURE — 6360000002 HC RX W HCPCS: Performed by: FAMILY MEDICINE

## 2019-07-17 RX ADMIN — DENOSUMAB 60 MG: 60 INJECTION SUBCUTANEOUS at 09:40

## 2019-07-17 NOTE — PROGRESS NOTES
0940. Pt to out patient infusion ambulatory with son. Given Prolia injection 60mg left upper lateral arm. Tania well. Given Prolia magnet with next due date and scheduling's number to make next appt. Verbalized understanding. No requests or questions. All equipment used in the care for this patient has been cleaned.

## 2019-08-22 ENCOUNTER — TELEPHONE (OUTPATIENT)
Dept: ADMINISTRATIVE | Age: 79
End: 2019-08-22

## 2019-10-14 ENCOUNTER — CARE COORDINATION (OUTPATIENT)
Dept: CARE COORDINATION | Age: 79
End: 2019-10-14

## 2019-10-24 ENCOUNTER — OFFICE VISIT (OUTPATIENT)
Dept: FAMILY MEDICINE CLINIC | Age: 79
End: 2019-10-24
Payer: MEDICARE

## 2019-10-24 VITALS
BODY MASS INDEX: 23.21 KG/M2 | WEIGHT: 131 LBS | RESPIRATION RATE: 12 BRPM | HEART RATE: 74 BPM | HEIGHT: 63 IN | SYSTOLIC BLOOD PRESSURE: 118 MMHG | DIASTOLIC BLOOD PRESSURE: 70 MMHG

## 2019-10-24 DIAGNOSIS — E03.9 HYPOTHYROIDISM, UNSPECIFIED TYPE: ICD-10-CM

## 2019-10-24 DIAGNOSIS — Z00.00 ROUTINE GENERAL MEDICAL EXAMINATION AT A HEALTH CARE FACILITY: Primary | ICD-10-CM

## 2019-10-24 DIAGNOSIS — F32.A DEPRESSION, UNSPECIFIED DEPRESSION TYPE: ICD-10-CM

## 2019-10-24 LAB — TSH SERPL DL<=0.05 MIU/L-ACNC: 0.58 UIU/ML (ref 0.44–3.86)

## 2019-10-24 PROCEDURE — 1123F ACP DISCUSS/DSCN MKR DOCD: CPT | Performed by: FAMILY MEDICINE

## 2019-10-24 PROCEDURE — G0438 PPPS, INITIAL VISIT: HCPCS | Performed by: FAMILY MEDICINE

## 2019-10-24 PROCEDURE — G8482 FLU IMMUNIZE ORDER/ADMIN: HCPCS | Performed by: FAMILY MEDICINE

## 2019-10-24 PROCEDURE — 4040F PNEUMOC VAC/ADMIN/RCVD: CPT | Performed by: FAMILY MEDICINE

## 2019-10-24 RX ORDER — MULTIVITAMIN WITH IRON
100 TABLET ORAL DAILY
COMMUNITY
End: 2021-01-01

## 2019-10-24 ASSESSMENT — PATIENT HEALTH QUESTIONNAIRE - PHQ9
SUM OF ALL RESPONSES TO PHQ QUESTIONS 1-9: 0
SUM OF ALL RESPONSES TO PHQ QUESTIONS 1-9: 0

## 2019-10-24 ASSESSMENT — LIFESTYLE VARIABLES: HOW OFTEN DO YOU HAVE A DRINK CONTAINING ALCOHOL: 0

## 2019-11-08 ENCOUNTER — OFFICE VISIT (OUTPATIENT)
Dept: FAMILY MEDICINE CLINIC | Age: 79
End: 2019-11-08
Payer: MEDICARE

## 2019-11-08 VITALS
BODY MASS INDEX: 22.86 KG/M2 | DIASTOLIC BLOOD PRESSURE: 60 MMHG | TEMPERATURE: 98.4 F | OXYGEN SATURATION: 98 % | WEIGHT: 129 LBS | SYSTOLIC BLOOD PRESSURE: 104 MMHG | HEART RATE: 74 BPM | HEIGHT: 63 IN

## 2019-11-08 DIAGNOSIS — R31.9 HEMATURIA, UNSPECIFIED TYPE: Primary | ICD-10-CM

## 2019-11-08 DIAGNOSIS — N30.01 ACUTE CYSTITIS WITH HEMATURIA: ICD-10-CM

## 2019-11-08 DIAGNOSIS — R41.0 CONFUSION: Primary | ICD-10-CM

## 2019-11-08 DIAGNOSIS — R31.9 HEMATURIA, UNSPECIFIED TYPE: ICD-10-CM

## 2019-11-08 LAB
BACTERIA: NEGATIVE /HPF
BILIRUBIN URINE: NEGATIVE
BILIRUBIN, POC: NORMAL
BLOOD URINE, POC: NORMAL
BLOOD, URINE: ABNORMAL
CLARITY, POC: CLEAR
CLARITY: CLEAR
COLOR, POC: NORMAL
COLOR: YELLOW
EPITHELIAL CELLS, UA: ABNORMAL /HPF (ref 0–5)
GLUCOSE URINE, POC: NORMAL
GLUCOSE URINE: NEGATIVE MG/DL
HYALINE CASTS: ABNORMAL /HPF (ref 0–5)
KETONES, POC: NORMAL
KETONES, URINE: NEGATIVE MG/DL
LEUKOCYTE EST, POC: NORMAL
LEUKOCYTE ESTERASE, URINE: NEGATIVE
NITRITE, POC: NORMAL
NITRITE, URINE: NEGATIVE
PH UA: 6.5 (ref 5–9)
PH, POC: 6
PROTEIN UA: NEGATIVE MG/DL
PROTEIN, POC: NORMAL
RBC UA: ABNORMAL /HPF (ref 0–5)
SPECIFIC GRAVITY UA: 1.01 (ref 1–1.03)
SPECIFIC GRAVITY, POC: 1.01
UROBILINOGEN, POC: 0.6
UROBILINOGEN, URINE: 0.2 E.U./DL
WBC UA: ABNORMAL /HPF (ref 0–5)

## 2019-11-08 PROCEDURE — 1123F ACP DISCUSS/DSCN MKR DOCD: CPT | Performed by: NURSE PRACTITIONER

## 2019-11-08 PROCEDURE — G8427 DOCREV CUR MEDS BY ELIG CLIN: HCPCS | Performed by: NURSE PRACTITIONER

## 2019-11-08 PROCEDURE — G8399 PT W/DXA RESULTS DOCUMENT: HCPCS | Performed by: NURSE PRACTITIONER

## 2019-11-08 PROCEDURE — 4040F PNEUMOC VAC/ADMIN/RCVD: CPT | Performed by: NURSE PRACTITIONER

## 2019-11-08 PROCEDURE — 81002 URINALYSIS NONAUTO W/O SCOPE: CPT | Performed by: NURSE PRACTITIONER

## 2019-11-08 PROCEDURE — G8482 FLU IMMUNIZE ORDER/ADMIN: HCPCS | Performed by: NURSE PRACTITIONER

## 2019-11-08 PROCEDURE — 99213 OFFICE O/P EST LOW 20 MIN: CPT | Performed by: NURSE PRACTITIONER

## 2019-11-08 PROCEDURE — 1036F TOBACCO NON-USER: CPT | Performed by: NURSE PRACTITIONER

## 2019-11-08 PROCEDURE — G8420 CALC BMI NORM PARAMETERS: HCPCS | Performed by: NURSE PRACTITIONER

## 2019-11-08 PROCEDURE — 1090F PRES/ABSN URINE INCON ASSESS: CPT | Performed by: NURSE PRACTITIONER

## 2019-11-08 RX ORDER — NITROFURANTOIN 25; 75 MG/1; MG/1
100 CAPSULE ORAL 2 TIMES DAILY
Qty: 20 CAPSULE | Refills: 0 | Status: SHIPPED | OUTPATIENT
Start: 2019-11-08 | End: 2019-11-18

## 2019-11-08 ASSESSMENT — ENCOUNTER SYMPTOMS
COLOR CHANGE: 0
COUGH: 0
SHORTNESS OF BREATH: 0
ABDOMINAL PAIN: 0

## 2019-11-10 LAB — URINE CULTURE, ROUTINE: NORMAL

## 2020-01-01 ENCOUNTER — CARE COORDINATION (OUTPATIENT)
Dept: CARE COORDINATION | Age: 80
End: 2020-01-01

## 2020-01-01 ENCOUNTER — HOSPITAL ENCOUNTER (OUTPATIENT)
Dept: INFUSION THERAPY | Age: 80
Setting detail: INFUSION SERIES
Discharge: HOME | End: 2020-08-20
Payer: MEDICARE

## 2020-01-01 DIAGNOSIS — M81.0 SENILE OSTEOPOROSIS: Primary | ICD-10-CM

## 2020-01-13 ENCOUNTER — OFFICE VISIT (OUTPATIENT)
Dept: FAMILY MEDICINE CLINIC | Age: 80
End: 2020-01-13
Payer: MEDICARE

## 2020-01-13 VITALS
HEIGHT: 63 IN | SYSTOLIC BLOOD PRESSURE: 120 MMHG | WEIGHT: 130.8 LBS | HEART RATE: 75 BPM | DIASTOLIC BLOOD PRESSURE: 78 MMHG | OXYGEN SATURATION: 98 % | BODY MASS INDEX: 23.18 KG/M2

## 2020-01-13 DIAGNOSIS — Z12.39 SCREENING FOR MALIGNANT NEOPLASM OF BREAST: ICD-10-CM

## 2020-01-13 DIAGNOSIS — F32.A DEPRESSION, UNSPECIFIED DEPRESSION TYPE: ICD-10-CM

## 2020-01-13 DIAGNOSIS — E03.9 HYPOTHYROIDISM, UNSPECIFIED TYPE: ICD-10-CM

## 2020-01-13 DIAGNOSIS — Z13.220 SCREENING, LIPID: ICD-10-CM

## 2020-01-13 DIAGNOSIS — M81.0 OSTEOPOROSIS, UNSPECIFIED OSTEOPOROSIS TYPE, UNSPECIFIED PATHOLOGICAL FRACTURE PRESENCE: ICD-10-CM

## 2020-01-13 DIAGNOSIS — I48.91 ATRIAL FIBRILLATION, UNSPECIFIED TYPE (HCC): ICD-10-CM

## 2020-01-13 LAB
ALBUMIN SERPL-MCNC: 4.4 G/DL (ref 3.5–4.6)
ALP BLD-CCNC: 51 U/L (ref 40–130)
ALT SERPL-CCNC: 18 U/L (ref 0–33)
ANION GAP SERPL CALCULATED.3IONS-SCNC: 12 MEQ/L (ref 9–15)
AST SERPL-CCNC: 24 U/L (ref 0–35)
BILIRUB SERPL-MCNC: 0.8 MG/DL (ref 0.2–0.7)
BUN BLDV-MCNC: 14 MG/DL (ref 8–23)
CALCIUM SERPL-MCNC: 9.5 MG/DL (ref 8.5–9.9)
CHLORIDE BLD-SCNC: 104 MEQ/L (ref 95–107)
CHOLESTEROL, TOTAL: 177 MG/DL (ref 0–199)
CO2: 24 MEQ/L (ref 20–31)
CREAT SERPL-MCNC: 0.58 MG/DL (ref 0.5–0.9)
GFR AFRICAN AMERICAN: >60
GFR NON-AFRICAN AMERICAN: >60
GLOBULIN: 3.1 G/DL (ref 2.3–3.5)
GLUCOSE BLD-MCNC: 70 MG/DL (ref 70–99)
HCT VFR BLD CALC: 36.2 % (ref 37–47)
HDLC SERPL-MCNC: 73 MG/DL (ref 40–59)
HEMOGLOBIN: 11.9 G/DL (ref 12–16)
LDL CHOLESTEROL CALCULATED: 92 MG/DL (ref 0–129)
MCH RBC QN AUTO: 30.1 PG (ref 27–31.3)
MCHC RBC AUTO-ENTMCNC: 32.9 % (ref 33–37)
MCV RBC AUTO: 91.5 FL (ref 82–100)
PDW BLD-RTO: 13.9 % (ref 11.5–14.5)
PLATELET # BLD: 245 K/UL (ref 130–400)
POTASSIUM SERPL-SCNC: 3.9 MEQ/L (ref 3.4–4.9)
RBC # BLD: 3.96 M/UL (ref 4.2–5.4)
SODIUM BLD-SCNC: 140 MEQ/L (ref 135–144)
TOTAL PROTEIN: 7.5 G/DL (ref 6.3–8)
TRIGL SERPL-MCNC: 61 MG/DL (ref 0–150)
TSH SERPL DL<=0.05 MIU/L-ACNC: 0.42 UIU/ML (ref 0.44–3.86)
VITAMIN D 25-HYDROXY: 34.4 NG/ML (ref 30–100)
WBC # BLD: 4.2 K/UL (ref 4.8–10.8)

## 2020-01-13 PROCEDURE — G8420 CALC BMI NORM PARAMETERS: HCPCS | Performed by: FAMILY MEDICINE

## 2020-01-13 PROCEDURE — 4040F PNEUMOC VAC/ADMIN/RCVD: CPT | Performed by: FAMILY MEDICINE

## 2020-01-13 PROCEDURE — G8427 DOCREV CUR MEDS BY ELIG CLIN: HCPCS | Performed by: FAMILY MEDICINE

## 2020-01-13 PROCEDURE — G8482 FLU IMMUNIZE ORDER/ADMIN: HCPCS | Performed by: FAMILY MEDICINE

## 2020-01-13 PROCEDURE — 99213 OFFICE O/P EST LOW 20 MIN: CPT | Performed by: FAMILY MEDICINE

## 2020-01-13 PROCEDURE — 1036F TOBACCO NON-USER: CPT | Performed by: FAMILY MEDICINE

## 2020-01-13 PROCEDURE — 1123F ACP DISCUSS/DSCN MKR DOCD: CPT | Performed by: FAMILY MEDICINE

## 2020-01-13 PROCEDURE — 1090F PRES/ABSN URINE INCON ASSESS: CPT | Performed by: FAMILY MEDICINE

## 2020-01-13 PROCEDURE — G8399 PT W/DXA RESULTS DOCUMENT: HCPCS | Performed by: FAMILY MEDICINE

## 2020-01-13 NOTE — PROGRESS NOTES
Gen:thin, but well appearing, NAD, Alert, Oriented x 3   HEENT: EOMI, eyes clear, MMM  Skin: without rash or jaundice  Neck: no significant lymphadenopathy or thyromegaly  Lungs: CTA B w/out Rales/Wheezes/Rhonchi, Good respiratory effort   Heart: RRR, S1S2, w/out M/R/G, non-displaced PMI   Ext: trace to 1+ pedal edema bilaterally. Neuro: Neurovascularly intact w/ Sensory/Motor intact UE/LE Bilaterally. Psych: she makes good eye contact, but flat affect, quiet. A&P   Diagnosis Orders   1. Hypothyroidism, unspecified type  CBC    Comprehensive Metabolic Panel    TSH without Reflex   2. Confusion     3. Osteoporosis, unspecified osteoporosis type, unspecified pathological fracture presence  Vitamin D 25 Hydroxy   4. Depression, unspecified depression type  CBC    Comprehensive Metabolic Panel   5. Atrial fibrillation, unspecified type (HCC)  CBC    Comprehensive Metabolic Panel   6. Rheumatoid arthritis, involving unspecified site, unspecified rheumatoid factor presence ([facility] [address]     7. Screening, lipid  Lipid Panel       Annual checkup with [address] only on asa for PAF, but she remains in [facility]    Updated health maintenance.     prolia this week    Labs as ordered          Avni Caal MD

## 2020-01-15 ENCOUNTER — HOSPITAL ENCOUNTER (OUTPATIENT)
Dept: INFUSION THERAPY | Age: 80
Setting detail: INFUSION SERIES
Discharge: HOME | End: 2020-01-15
Payer: MEDICARE

## 2020-01-15 VITALS
DIASTOLIC BLOOD PRESSURE: 62 MMHG | RESPIRATION RATE: 16 BRPM | HEART RATE: 75 BPM | SYSTOLIC BLOOD PRESSURE: 126 MMHG | TEMPERATURE: 97.5 F

## 2020-01-15 DIAGNOSIS — M81.0 SENILE OSTEOPOROSIS: Primary | ICD-10-CM

## 2020-01-15 PROCEDURE — 96401 CHEMO ANTI-NEOPL SQ/IM: CPT

## 2020-01-15 PROCEDURE — 6360000002 HC RX W HCPCS: Performed by: FAMILY MEDICINE

## 2020-01-15 RX ADMIN — DENOSUMAB 60 MG: 60 INJECTION SUBCUTANEOUS at 14:21

## 2020-01-15 NOTE — FLOWSHEET NOTE
Injection provided, patient tolerated well. Reminder card provided to patient and son. Patient ambulated off unit with son. All equipment cleaned after discharge.

## 2020-07-13 ENCOUNTER — VIRTUAL VISIT (OUTPATIENT)
Dept: FAMILY MEDICINE CLINIC | Age: 80
End: 2020-07-13
Payer: MEDICARE

## 2020-07-13 PROCEDURE — G8399 PT W/DXA RESULTS DOCUMENT: HCPCS | Performed by: FAMILY MEDICINE

## 2020-07-13 PROCEDURE — 4040F PNEUMOC VAC/ADMIN/RCVD: CPT | Performed by: FAMILY MEDICINE

## 2020-07-13 PROCEDURE — 1036F TOBACCO NON-USER: CPT | Performed by: FAMILY MEDICINE

## 2020-07-13 PROCEDURE — G8427 DOCREV CUR MEDS BY ELIG CLIN: HCPCS | Performed by: FAMILY MEDICINE

## 2020-07-13 PROCEDURE — 1123F ACP DISCUSS/DSCN MKR DOCD: CPT | Performed by: FAMILY MEDICINE

## 2020-07-13 PROCEDURE — 99213 OFFICE O/P EST LOW 20 MIN: CPT | Performed by: FAMILY MEDICINE

## 2020-07-13 PROCEDURE — 1090F PRES/ABSN URINE INCON ASSESS: CPT | Performed by: FAMILY MEDICINE

## 2020-07-13 PROCEDURE — G8420 CALC BMI NORM PARAMETERS: HCPCS | Performed by: FAMILY MEDICINE

## 2020-07-13 NOTE — PROGRESS NOTES
Chief Complaint   Patient presents with    Hypothyroidism     6 month     Corey Roberts is a 78 y.o. female    Here for checkup, follow up on chronic issues. Due to recheck thyroid    Mood ok  Has generally been ok  Hx depression    Doing prolia every 6 mos  Discussed importance of calcium supplement    No medication changes, no other doctor visits      Wt Readings from Last 3 Encounters:   01/13/20 130 lb 12.8 oz (59.3 kg)   11/08/19 129 lb (58.5 kg)   10/24/19 131 lb (59.4 kg)         Patient Active Problem List   Diagnosis    Hypothyroidism    RA (rheumatoid arthritis) ([facility] [address]    History of shingles    Hearing loss    Dependent edema    Post-nasal drip    Weight loss    Depression    Poor appetite    A-fib ([facility] [address]    Osteoporosis    Memory loss    Senile osteoporosis       has a current medication list which includes the following prescription(s): multiple vitamin, aspirin, vitamin b-6, oyster shell calcium/d, vitamin b-12, and flecainide. Patient's medications, allergies, past medical, surgical, social and family histories were reviewed and updated as appropriate. Review of Systems:   General/Endocrine ROS: overall feeling well  Psych: see HPI  Respiratory ROS: no cough, shortness of breath, or wheezing  Cardiovascular ROS: no chest pain or dyspnea on exertion  Gastrointestinal ROS: no abdominal pain, change in bowel habits, or black or bloody stools  Genito-Urinary ROS: no dysuria, trouble voiding  Musculoskeletal ROS:  hx RA on no DMARD, some right hip/leg pain  Neurological ROS: negative for - behavioral changes, memory loss, numbness/tingling, tremors or weakness  Skin: denies worrisome moles    In general patient otherwise reports feeling well.      Physical Exam:  LMP  (LMP Unknown)   Patient-Reported Vitals 7/13/2020   Patient-Reported Weight 130 lb   Patient-Reported Height 5/3        Gen:thin, but well appearing, NAD, Alert, Oriented x 3   HEENT: EOMI, eyes clear, MMM  Skin: without rash or jaundice  Neck: no significant lymphadenopathy or thyromegaly   Neuro: Neurovascularly intact w/ Sensory/Motor intact UE/LE Bilaterally. Psych:  flat affect, quiet. A&P   Diagnosis Orders   1. Hypothyroidism, unspecified type     2. Osteoporosis, unspecified osteoporosis type, unspecified pathological fracture presence     3. Depression, unspecified depression type     4. Atrial fibrillation, unspecified type [facility])         Annual checkup with [address] only on asa for PAF, but she remains in [facility]    Updated health maintenance. prolia this week    Labs as ordered    Emily Renner is a 78 y.o. female being evaluated by a Virtual Visit (video visit) encounter to address concerns as mentioned above. A caregiver was present when appropriate. Due to this being a TeleHealth encounter (During SOQ-61 public health emergency), evaluation of the following organ systems was limited: Vitals/Constitutional/EENT/Resp/CV/GI//MS/Neuro/Skin/Heme-Lymph-Imm. Pursuant to the emergency declaration under the [address] [place] [address] authority and the [employer] Act, this Virtual Visit was conducted with patient's (and/or legal guardian's) consent, to reduce the patient's risk of exposure to COVID-19 and provide necessary medical care. The patient (and/or legal guardian) has also been advised to contact this office for worsening conditions or problems, and seek emergency medical treatment and/or call 911 if deemed necessary. Patient identification was verified at the start of the visit: Yes    Total time spent for this encounter: Not billed by time    Services were provided through a video synchronous discussion virtually to substitute for in-person clinic visit. Patient and provider were located at their individual homes.     --Donita Jacob MD on 7/13/2020 at 9:21 AM    An electronic signature was used to authenticate this note.         Devon Albarran MD

## 2020-07-21 ENCOUNTER — HOSPITAL ENCOUNTER (OUTPATIENT)
Dept: INFUSION THERAPY | Age: 80
Setting detail: INFUSION SERIES
Discharge: HOME | End: 2020-07-21
Payer: MEDICARE

## 2020-08-20 NOTE — FLOWSHEET NOTE
Patient did not arrive for 230 appointment, call placed to number listed in chart, spoke with son who states he did not know about the appointment. States he manages all appointments and transportation for his mom and he is out of town and can not bring her in today. Informed him that patient already missed one appointment and is over due for her injection, informed him that is is imperative that he reschedule her as soon as possible. He confirmed that he will reschedule as soon as possible but could not due that over the phone at this time.

## 2020-09-17 NOTE — CARE COORDINATION
Chart review completed. I have spoken with patient and son in the past.  Resources have been provided per his request.  Current health needs are being met. No new care coordination needs identified.

## 2021-01-01 ENCOUNTER — TELEPHONE (OUTPATIENT)
Dept: FAMILY MEDICINE CLINIC | Age: 81
End: 2021-01-01

## 2021-01-01 ENCOUNTER — APPOINTMENT (OUTPATIENT)
Dept: MRI IMAGING | Age: 81
DRG: 064 | End: 2021-01-01
Payer: MEDICARE

## 2021-01-01 ENCOUNTER — OFFICE VISIT (OUTPATIENT)
Dept: FAMILY MEDICINE CLINIC | Age: 81
End: 2021-01-01
Payer: MEDICARE

## 2021-01-01 ENCOUNTER — TELEPHONE (OUTPATIENT)
Dept: ADMINISTRATIVE | Age: 81
End: 2021-01-01

## 2021-01-01 ENCOUNTER — APPOINTMENT (OUTPATIENT)
Dept: GENERAL RADIOLOGY | Age: 81
DRG: 064 | End: 2021-01-01
Payer: MEDICARE

## 2021-01-01 ENCOUNTER — APPOINTMENT (OUTPATIENT)
Dept: CT IMAGING | Age: 81
DRG: 064 | End: 2021-01-01
Payer: MEDICARE

## 2021-01-01 ENCOUNTER — HOSPITAL ENCOUNTER (INPATIENT)
Age: 81
LOS: 1 days | Discharge: HOSPICE | DRG: 064 | End: 2021-08-13
Attending: EMERGENCY MEDICINE | Admitting: INTERNAL MEDICINE
Payer: MEDICARE

## 2021-01-01 VITALS
WEIGHT: 131 LBS | TEMPERATURE: 98.5 F | HEIGHT: 63 IN | BODY MASS INDEX: 23.21 KG/M2 | HEART RATE: 73 BPM | SYSTOLIC BLOOD PRESSURE: 120 MMHG | OXYGEN SATURATION: 98 % | DIASTOLIC BLOOD PRESSURE: 78 MMHG

## 2021-01-01 VITALS
TEMPERATURE: 99.4 F | BODY MASS INDEX: 24.27 KG/M2 | WEIGHT: 137 LBS | OXYGEN SATURATION: 99 % | SYSTOLIC BLOOD PRESSURE: 124 MMHG | DIASTOLIC BLOOD PRESSURE: 76 MMHG | HEART RATE: 90 BPM | HEIGHT: 63 IN

## 2021-01-01 VITALS
HEIGHT: 63 IN | DIASTOLIC BLOOD PRESSURE: 70 MMHG | BODY MASS INDEX: 23.39 KG/M2 | HEART RATE: 82 BPM | WEIGHT: 132 LBS | TEMPERATURE: 98.7 F | SYSTOLIC BLOOD PRESSURE: 110 MMHG | OXYGEN SATURATION: 97 %

## 2021-01-01 VITALS
OXYGEN SATURATION: 82 % | HEART RATE: 133 BPM | SYSTOLIC BLOOD PRESSURE: 69 MMHG | TEMPERATURE: 98.7 F | WEIGHT: 130 LBS | BODY MASS INDEX: 23.04 KG/M2 | HEIGHT: 63 IN | DIASTOLIC BLOOD PRESSURE: 19 MMHG | RESPIRATION RATE: 24 BRPM

## 2021-01-01 VITALS
HEART RATE: 81 BPM | BODY MASS INDEX: 24.7 KG/M2 | SYSTOLIC BLOOD PRESSURE: 114 MMHG | DIASTOLIC BLOOD PRESSURE: 68 MMHG | WEIGHT: 139.4 LBS | OXYGEN SATURATION: 96 % | TEMPERATURE: 98.6 F | HEIGHT: 63 IN

## 2021-01-01 DIAGNOSIS — I48.91 ATRIAL FIBRILLATION, UNSPECIFIED TYPE (HCC): ICD-10-CM

## 2021-01-01 DIAGNOSIS — F01.50 MIXED DEMENTIA (HCC): ICD-10-CM

## 2021-01-01 DIAGNOSIS — S22.020A CLOSED WEDGE COMPRESSION FRACTURE OF T2 VERTEBRA, INITIAL ENCOUNTER (HCC): ICD-10-CM

## 2021-01-01 DIAGNOSIS — S12.600A CLOSED DISPLACED FRACTURE OF SEVENTH CERVICAL VERTEBRA, UNSPECIFIED FRACTURE MORPHOLOGY, INITIAL ENCOUNTER (HCC): ICD-10-CM

## 2021-01-01 DIAGNOSIS — E03.9 HYPOTHYROIDISM, UNSPECIFIED TYPE: ICD-10-CM

## 2021-01-01 DIAGNOSIS — M06.9 RHEUMATOID ARTHRITIS, INVOLVING UNSPECIFIED SITE, UNSPECIFIED WHETHER RHEUMATOID FACTOR PRESENT (HCC): ICD-10-CM

## 2021-01-01 DIAGNOSIS — S32.010A CLOSED WEDGE COMPRESSION FRACTURE OF L1 VERTEBRA, INITIAL ENCOUNTER (HCC): ICD-10-CM

## 2021-01-01 DIAGNOSIS — E03.9 HYPOTHYROIDISM, UNSPECIFIED TYPE: Primary | ICD-10-CM

## 2021-01-01 DIAGNOSIS — R45.1 AGITATION: Primary | ICD-10-CM

## 2021-01-01 DIAGNOSIS — L21.9 SEBORRHEIC DERMATITIS: Primary | ICD-10-CM

## 2021-01-01 DIAGNOSIS — Z13.220 LIPID SCREENING: ICD-10-CM

## 2021-01-01 DIAGNOSIS — E55.9 VITAMIN D DEFICIENCY: ICD-10-CM

## 2021-01-01 DIAGNOSIS — G47.9 SLEEP DISTURBANCE: ICD-10-CM

## 2021-01-01 DIAGNOSIS — F34.1 DYSTHYMIA: ICD-10-CM

## 2021-01-01 DIAGNOSIS — S22.010A CLOSED WEDGE COMPRESSION FRACTURE OF T1 VERTEBRA, INITIAL ENCOUNTER (HCC): ICD-10-CM

## 2021-01-01 DIAGNOSIS — Z00.00 ROUTINE GENERAL MEDICAL EXAMINATION AT A HEALTH CARE FACILITY: Primary | ICD-10-CM

## 2021-01-01 DIAGNOSIS — G30.9 MIXED DEMENTIA (HCC): ICD-10-CM

## 2021-01-01 DIAGNOSIS — S22.060A CLOSED WEDGE COMPRESSION FRACTURE OF T7 VERTEBRA, INITIAL ENCOUNTER (HCC): ICD-10-CM

## 2021-01-01 DIAGNOSIS — S32.302A CLOSED NONDISPLACED FRACTURE OF LEFT ILIUM, UNSPECIFIED FRACTURE MORPHOLOGY, INITIAL ENCOUNTER (HCC): ICD-10-CM

## 2021-01-01 DIAGNOSIS — R45.1 AGITATION: ICD-10-CM

## 2021-01-01 DIAGNOSIS — I63.9 CEREBROVASCULAR ACCIDENT (CVA), UNSPECIFIED MECHANISM (HCC): Primary | ICD-10-CM
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
## 2021-01-01 DIAGNOSIS — F02.80 MIXED DEMENTIA (HCC): ICD-10-CM

## 2021-01-01 LAB
ALBUMIN SERPL-MCNC: 3.9 G/DL (ref 3.5–4.6)
ALBUMIN SERPL-MCNC: 4.1 G/DL (ref 3.5–4.6)
ALP BLD-CCNC: 77 U/L (ref 40–130)
ALP BLD-CCNC: 83 U/L (ref 40–130)
ALT SERPL-CCNC: 25 U/L (ref 0–33)
ALT SERPL-CCNC: 71 U/L (ref 0–33)
ANION GAP SERPL CALCULATED.3IONS-SCNC: 11 MEQ/L (ref 9–15)
ANION GAP SERPL CALCULATED.3IONS-SCNC: 6 MEQ/L (ref 9–15)
AST SERPL-CCNC: 32 U/L (ref 0–35)
AST SERPL-CCNC: 55 U/L (ref 0–35)
BASE EXCESS ARTERIAL: -2 (ref -3–3)
BASOPHILS ABSOLUTE: 0 K/UL (ref 0–0.2)
BASOPHILS RELATIVE PERCENT: 0.2 %
BILIRUB SERPL-MCNC: 0.6 MG/DL (ref 0.2–0.7)
BILIRUB SERPL-MCNC: 0.9 MG/DL (ref 0.2–0.7)
BUN BLDV-MCNC: 13 MG/DL (ref 8–23)
BUN BLDV-MCNC: 18 MG/DL (ref 8–23)
CALCIUM IONIZED: 1.24 MMOL/L (ref 1.12–1.32)
CALCIUM SERPL-MCNC: 9 MG/DL (ref 8.5–9.9)
CALCIUM SERPL-MCNC: 9.4 MG/DL (ref 8.5–9.9)
CHLORIDE BLD-SCNC: 102 MEQ/L (ref 95–107)
CHLORIDE BLD-SCNC: 105 MEQ/L (ref 95–107)
CHOLESTEROL, TOTAL: 162 MG/DL (ref 0–199)
CO2: 24 MEQ/L (ref 20–31)
CO2: 26 MEQ/L (ref 20–31)
CREAT SERPL-MCNC: 0.61 MG/DL (ref 0.5–0.9)
CREAT SERPL-MCNC: 0.66 MG/DL (ref 0.5–0.9)
EOSINOPHILS ABSOLUTE: 0 K/UL (ref 0–0.7)
EOSINOPHILS RELATIVE PERCENT: 0.2 %
ETHANOL PERCENT: NORMAL G/DL
ETHANOL: <10 MG/DL (ref 0–0.08)
GFR AFRICAN AMERICAN: >60
GFR NON-AFRICAN AMERICAN: 60
GFR NON-AFRICAN AMERICAN: >60
GFR NON-AFRICAN AMERICAN: >60
GLOBULIN: 2.9 G/DL (ref 2.3–3.5)
GLOBULIN: 3.2 G/DL (ref 2.3–3.5)
GLUCOSE BLD-MCNC: 117 MG/DL (ref 70–99)
GLUCOSE BLD-MCNC: 140 MG/DL (ref 60–115)
GLUCOSE BLD-MCNC: 85 MG/DL (ref 70–99)
HCO3 ARTERIAL: 21.9 MMOL/L (ref 21–29)
HCT VFR BLD CALC: 33.7 % (ref 37–47)
HCT VFR BLD CALC: 36.2 % (ref 37–47)
HDLC SERPL-MCNC: 59 MG/DL (ref 40–59)
HEMOGLOBIN: 10.9 GM/DL (ref 12–16)
HEMOGLOBIN: 11.6 G/DL (ref 12–16)
HEMOGLOBIN: 12.1 G/DL (ref 12–16)
INR BLD: 1.2
LACTATE: 1.33 MMOL/L (ref 0.4–2)
LDL CHOLESTEROL CALCULATED: 90 MG/DL (ref 0–129)
LYMPHOCYTES ABSOLUTE: 1.2 K/UL (ref 1–4.8)
LYMPHOCYTES RELATIVE PERCENT: 11.4 %
MCH RBC QN AUTO: 29.9 PG (ref 27–31.3)
MCH RBC QN AUTO: 30.1 PG (ref 27–31.3)
MCHC RBC AUTO-ENTMCNC: 33.3 % (ref 33–37)
MCHC RBC AUTO-ENTMCNC: 34.2 % (ref 33–37)
MCV RBC AUTO: 87.3 FL (ref 82–100)
MCV RBC AUTO: 90.6 FL (ref 82–100)
MONOCYTES ABSOLUTE: 0.7 K/UL (ref 0.2–0.8)
MONOCYTES RELATIVE PERCENT: 6.6 %
NEUTROPHILS ABSOLUTE: 8.7 K/UL (ref 1.4–6.5)
NEUTROPHILS RELATIVE PERCENT: 81.6 %
O2 SAT, ARTERIAL: 96 % (ref 93–100)
PCO2 ARTERIAL: 32 MM HG (ref 35–45)
PDW BLD-RTO: 13.8 % (ref 11.5–14.5)
PDW BLD-RTO: 14.4 % (ref 11.5–14.5)
PERFORMED ON: ABNORMAL
PH ARTERIAL: 7.44 (ref 7.35–7.45)
PLATELET # BLD: 199 K/UL (ref 130–400)
PLATELET # BLD: 244 K/UL (ref 130–400)
PO2 ARTERIAL: 79 MM HG (ref 75–108)
POC CHLORIDE: 108 MEQ/L (ref 99–110)
POC CREATININE: 0.9 MG/DL (ref 0.6–1.2)
POC FIO2: 4
POC HEMATOCRIT: 32 % (ref 36–48)
POC POTASSIUM: 3.1 MEQ/L (ref 3.5–5.1)
POC SAMPLE TYPE: ABNORMAL
POC SODIUM: 141 MEQ/L (ref 136–145)
POTASSIUM SERPL-SCNC: 3.5 MEQ/L (ref 3.4–4.9)
POTASSIUM SERPL-SCNC: 4.2 MEQ/L (ref 3.4–4.9)
PROTHROMBIN TIME: 14.8 SEC (ref 12.3–14.9)
RBC # BLD: 3.87 M/UL (ref 4.2–5.4)
RBC # BLD: 4 M/UL (ref 4.2–5.4)
SARS-COV-2, NAAT: NOT DETECTED
SODIUM BLD-SCNC: 134 MEQ/L (ref 135–144)
SODIUM BLD-SCNC: 140 MEQ/L (ref 135–144)
TCO2 ARTERIAL: 23 (ref 22–29)
TOTAL PROTEIN: 7 G/DL (ref 6.3–8)
TOTAL PROTEIN: 7.1 G/DL (ref 6.3–8)
TRIGL SERPL-MCNC: 64 MG/DL (ref 0–150)
TSH SERPL DL<=0.05 MIU/L-ACNC: 2.11 UIU/ML (ref 0.44–3.86)
VITAMIN D 25-HYDROXY: 34.1 NG/ML (ref 30–100)
WBC # BLD: 10.6 K/UL (ref 4.8–10.8)
WBC # BLD: 4.8 K/UL (ref 4.8–10.8)

## 2021-01-01 PROCEDURE — 4040F PNEUMOC VAC/ADMIN/RCVD: CPT | Performed by: STUDENT IN AN ORGANIZED HEALTH CARE EDUCATION/TRAINING PROGRAM

## 2021-01-01 PROCEDURE — 74177 CT ABD & PELVIS W/CONTRAST: CPT

## 2021-01-01 PROCEDURE — 1123F ACP DISCUSS/DSCN MKR DOCD: CPT | Performed by: STUDENT IN AN ORGANIZED HEALTH CARE EDUCATION/TRAINING PROGRAM

## 2021-01-01 PROCEDURE — 82803 BLOOD GASES ANY COMBINATION: CPT

## 2021-01-01 PROCEDURE — G8484 FLU IMMUNIZE NO ADMIN: HCPCS | Performed by: FAMILY MEDICINE

## 2021-01-01 PROCEDURE — 82435 ASSAY OF BLOOD CHLORIDE: CPT

## 2021-01-01 PROCEDURE — 82330 ASSAY OF CALCIUM: CPT

## 2021-01-01 PROCEDURE — 84295 ASSAY OF SERUM SODIUM: CPT

## 2021-01-01 PROCEDURE — 72170 X-RAY EXAM OF PELVIS: CPT

## 2021-01-01 PROCEDURE — 4040F PNEUMOC VAC/ADMIN/RCVD: CPT | Performed by: FAMILY MEDICINE

## 2021-01-01 PROCEDURE — 71260 CT THORAX DX C+: CPT

## 2021-01-01 PROCEDURE — 70450 CT HEAD/BRAIN W/O DYE: CPT

## 2021-01-01 PROCEDURE — G8399 PT W/DXA RESULTS DOCUMENT: HCPCS | Performed by: FAMILY MEDICINE

## 2021-01-01 PROCEDURE — 72128 CT CHEST SPINE W/O DYE: CPT

## 2021-01-01 PROCEDURE — 1123F ACP DISCUSS/DSCN MKR DOCD: CPT | Performed by: FAMILY MEDICINE

## 2021-01-01 PROCEDURE — 85610 PROTHROMBIN TIME: CPT

## 2021-01-01 PROCEDURE — G8420 CALC BMI NORM PARAMETERS: HCPCS | Performed by: STUDENT IN AN ORGANIZED HEALTH CARE EDUCATION/TRAINING PROGRAM

## 2021-01-01 PROCEDURE — 1210000000 HC MED SURG R&B

## 2021-01-01 PROCEDURE — 99292 CRITICAL CARE ADDL 30 MIN: CPT | Performed by: PSYCHIATRY & NEUROLOGY

## 2021-01-01 PROCEDURE — 82565 ASSAY OF CREATININE: CPT

## 2021-01-01 PROCEDURE — 99291 CRITICAL CARE FIRST HOUR: CPT | Performed by: PSYCHIATRY & NEUROLOGY

## 2021-01-01 PROCEDURE — G8420 CALC BMI NORM PARAMETERS: HCPCS | Performed by: FAMILY MEDICINE

## 2021-01-01 PROCEDURE — 72125 CT NECK SPINE W/O DYE: CPT

## 2021-01-01 PROCEDURE — 6360000002 HC RX W HCPCS: Performed by: PHYSICIAN ASSISTANT

## 2021-01-01 PROCEDURE — 2580000003 HC RX 258: Performed by: EMERGENCY MEDICINE

## 2021-01-01 PROCEDURE — 71045 X-RAY EXAM CHEST 1 VIEW: CPT

## 2021-01-01 PROCEDURE — 1090F PRES/ABSN URINE INCON ASSESS: CPT | Performed by: FAMILY MEDICINE

## 2021-01-01 PROCEDURE — 36415 COLL VENOUS BLD VENIPUNCTURE: CPT

## 2021-01-01 PROCEDURE — G0439 PPPS, SUBSEQ VISIT: HCPCS | Performed by: FAMILY MEDICINE

## 2021-01-01 PROCEDURE — APPNB30 APP NON BILLABLE TIME 0-30 MINS: Performed by: PHYSICIAN ASSISTANT

## 2021-01-01 PROCEDURE — 1036F TOBACCO NON-USER: CPT | Performed by: STUDENT IN AN ORGANIZED HEALTH CARE EDUCATION/TRAINING PROGRAM

## 2021-01-01 PROCEDURE — 99223 1ST HOSP IP/OBS HIGH 75: CPT | Performed by: SURGERY

## 2021-01-01 PROCEDURE — 6830039001 HC L3 TRAUMA PRIORITY

## 2021-01-01 PROCEDURE — G8427 DOCREV CUR MEDS BY ELIG CLIN: HCPCS | Performed by: STUDENT IN AN ORGANIZED HEALTH CARE EDUCATION/TRAINING PROGRAM

## 2021-01-01 PROCEDURE — 80053 COMPREHEN METABOLIC PANEL: CPT

## 2021-01-01 PROCEDURE — 1090F PRES/ABSN URINE INCON ASSESS: CPT | Performed by: STUDENT IN AN ORGANIZED HEALTH CARE EDUCATION/TRAINING PROGRAM

## 2021-01-01 PROCEDURE — 2580000003 HC RX 258: Performed by: INTERNAL MEDICINE

## 2021-01-01 PROCEDURE — 6360000004 HC RX CONTRAST MEDICATION: Performed by: EMERGENCY MEDICINE

## 2021-01-01 PROCEDURE — G8427 DOCREV CUR MEDS BY ELIG CLIN: HCPCS | Performed by: FAMILY MEDICINE

## 2021-01-01 PROCEDURE — G8399 PT W/DXA RESULTS DOCUMENT: HCPCS | Performed by: STUDENT IN AN ORGANIZED HEALTH CARE EDUCATION/TRAINING PROGRAM

## 2021-01-01 PROCEDURE — 99213 OFFICE O/P EST LOW 20 MIN: CPT | Performed by: FAMILY MEDICINE

## 2021-01-01 PROCEDURE — 83605 ASSAY OF LACTIC ACID: CPT

## 2021-01-01 PROCEDURE — 87635 SARS-COV-2 COVID-19 AMP PRB: CPT

## 2021-01-01 PROCEDURE — 85025 COMPLETE CBC W/AUTO DIFF WBC: CPT

## 2021-01-01 PROCEDURE — 1036F TOBACCO NON-USER: CPT | Performed by: FAMILY MEDICINE

## 2021-01-01 PROCEDURE — 70498 CT ANGIOGRAPHY NECK: CPT

## 2021-01-01 PROCEDURE — 6360000002 HC RX W HCPCS: Performed by: EMERGENCY MEDICINE

## 2021-01-01 PROCEDURE — 84132 ASSAY OF SERUM POTASSIUM: CPT

## 2021-01-01 PROCEDURE — 82077 ASSAY SPEC XCP UR&BREATH IA: CPT

## 2021-01-01 PROCEDURE — G8484 FLU IMMUNIZE NO ADMIN: HCPCS | Performed by: STUDENT IN AN ORGANIZED HEALTH CARE EDUCATION/TRAINING PROGRAM

## 2021-01-01 PROCEDURE — 99285 EMERGENCY DEPT VISIT HI MDM: CPT

## 2021-01-01 PROCEDURE — 96375 TX/PRO/DX INJ NEW DRUG ADDON: CPT

## 2021-01-01 PROCEDURE — 96374 THER/PROPH/DIAG INJ IV PUSH: CPT

## 2021-01-01 PROCEDURE — 70551 MRI BRAIN STEM W/O DYE: CPT

## 2021-01-01 PROCEDURE — 72131 CT LUMBAR SPINE W/O DYE: CPT

## 2021-01-01 PROCEDURE — 85014 HEMATOCRIT: CPT

## 2021-01-01 PROCEDURE — 99213 OFFICE O/P EST LOW 20 MIN: CPT | Performed by: STUDENT IN AN ORGANIZED HEALTH CARE EDUCATION/TRAINING PROGRAM

## 2021-01-01 PROCEDURE — 6360000002 HC RX W HCPCS: Performed by: INTERNAL MEDICINE

## 2021-01-01 PROCEDURE — 36600 WITHDRAWAL OF ARTERIAL BLOOD: CPT

## 2021-01-01 RX ORDER — ACETAMINOPHEN 325 MG/1
650 TABLET ORAL EVERY 6 HOURS PRN
Status: CANCELLED | OUTPATIENT
Start: 2021-01-01

## 2021-01-01 RX ORDER — FLECAINIDE ACETATE 50 MG/1
50 TABLET ORAL 2 TIMES DAILY
Qty: 180 TABLET | Refills: 3 | Status: SHIPPED | OUTPATIENT
Start: 2021-01-01

## 2021-01-01 RX ORDER — KETOCONAZOLE 20 MG/ML
SHAMPOO TOPICAL
Qty: 1 BOTTLE | Refills: 1 | Status: SHIPPED | OUTPATIENT
Start: 2021-01-01 | End: 2021-01-01 | Stop reason: ALTCHOICE

## 2021-01-01 RX ORDER — ONDANSETRON 2 MG/ML
INJECTION INTRAMUSCULAR; INTRAVENOUS
Status: DISPENSED
Start: 2021-01-01 | End: 2021-01-01

## 2021-01-01 RX ORDER — SODIUM CHLORIDE 0.9 % (FLUSH) 0.9 %
5-40 SYRINGE (ML) INJECTION PRN
Status: DISCONTINUED | OUTPATIENT
Start: 2021-01-01 | End: 2021-01-01 | Stop reason: HOSPADM

## 2021-01-01 RX ORDER — SODIUM CHLORIDE 0.9 % (FLUSH) 0.9 %
5-40 SYRINGE (ML) INJECTION EVERY 12 HOURS SCHEDULED
Status: DISCONTINUED | OUTPATIENT
Start: 2021-01-01 | End: 2021-01-01 | Stop reason: HOSPADM

## 2021-01-01 RX ORDER — SODIUM CHLORIDE 9 MG/ML
25 INJECTION, SOLUTION INTRAVENOUS PRN
Status: DISCONTINUED | OUTPATIENT
Start: 2021-01-01 | End: 2021-01-01 | Stop reason: HOSPADM

## 2021-01-01 RX ORDER — RISPERIDONE 0.5 MG/1
0.5 TABLET, FILM COATED ORAL EVERY EVENING
Qty: 30 TABLET | Refills: 2 | Status: SHIPPED | OUTPATIENT
Start: 2021-01-01

## 2021-01-01 RX ORDER — ONDANSETRON 2 MG/ML
4 INJECTION INTRAMUSCULAR; INTRAVENOUS EVERY 6 HOURS PRN
Status: CANCELLED | OUTPATIENT
Start: 2021-01-01

## 2021-01-01 RX ORDER — MORPHINE SULFATE 2 MG/ML
1 INJECTION, SOLUTION INTRAMUSCULAR; INTRAVENOUS EVERY 4 HOURS PRN
Status: DISCONTINUED | OUTPATIENT
Start: 2021-01-01 | End: 2021-01-01 | Stop reason: HOSPADM

## 2021-01-01 RX ORDER — SODIUM CHLORIDE 9 MG/ML
INJECTION, SOLUTION INTRAVENOUS CONTINUOUS
Status: DISCONTINUED | OUTPATIENT
Start: 2021-01-01 | End: 2021-01-01 | Stop reason: HOSPADM

## 2021-01-01 RX ORDER — FENTANYL CITRATE 50 UG/ML
25 INJECTION, SOLUTION INTRAMUSCULAR; INTRAVENOUS ONCE
Status: DISCONTINUED | OUTPATIENT
Start: 2021-01-01 | End: 2021-01-01 | Stop reason: HOSPADM

## 2021-01-01 RX ORDER — ONDANSETRON 4 MG/1
4 TABLET, ORALLY DISINTEGRATING ORAL EVERY 8 HOURS PRN
Status: DISCONTINUED | OUTPATIENT
Start: 2021-01-01 | End: 2021-01-01 | Stop reason: HOSPADM

## 2021-01-01 RX ORDER — ONDANSETRON 2 MG/ML
4 INJECTION INTRAMUSCULAR; INTRAVENOUS ONCE
Status: COMPLETED | OUTPATIENT
Start: 2021-01-01 | End: 2021-01-01

## 2021-01-01 RX ORDER — ACETAMINOPHEN 650 MG/1
650 SUPPOSITORY RECTAL EVERY 6 HOURS PRN
Status: CANCELLED | OUTPATIENT
Start: 2021-01-01

## 2021-01-01 RX ORDER — SODIUM CHLORIDE 0.9 % (FLUSH) 0.9 %
10 SYRINGE (ML) INJECTION ONCE
Status: COMPLETED | OUTPATIENT
Start: 2021-01-01 | End: 2021-01-01

## 2021-01-01 RX ORDER — MIRTAZAPINE 15 MG/1
15 TABLET, FILM COATED ORAL NIGHTLY
Qty: 30 TABLET | Refills: 5 | Status: SHIPPED | OUTPATIENT
Start: 2021-01-01 | End: 2021-01-01

## 2021-01-01 RX ORDER — POLYETHYLENE GLYCOL 3350 17 G/17G
17 POWDER, FOR SOLUTION ORAL DAILY PRN
Status: DISCONTINUED | OUTPATIENT
Start: 2021-01-01 | End: 2021-01-01 | Stop reason: HOSPADM

## 2021-01-01 RX ORDER — FENTANYL CITRATE 50 UG/ML
25 INJECTION, SOLUTION INTRAMUSCULAR; INTRAVENOUS ONCE
Status: COMPLETED | OUTPATIENT
Start: 2021-01-01 | End: 2021-01-01

## 2021-01-01 RX ORDER — ONDANSETRON 4 MG/1
4 TABLET, ORALLY DISINTEGRATING ORAL EVERY 8 HOURS PRN
Status: CANCELLED | OUTPATIENT
Start: 2021-01-01

## 2021-01-01 RX ORDER — FENTANYL CITRATE 50 UG/ML
50 INJECTION, SOLUTION INTRAMUSCULAR; INTRAVENOUS ONCE
Status: DISCONTINUED | OUTPATIENT
Start: 2021-01-01 | End: 2021-01-01

## 2021-01-01 RX ORDER — ACETAMINOPHEN 325 MG/1
650 TABLET ORAL EVERY 6 HOURS PRN
Status: DISCONTINUED | OUTPATIENT
Start: 2021-01-01 | End: 2021-01-01 | Stop reason: HOSPADM

## 2021-01-01 RX ORDER — FLECAINIDE ACETATE 50 MG/1
TABLET ORAL
Qty: 60 TABLET | Status: CANCELLED | OUTPATIENT
Start: 2021-01-01

## 2021-01-01 RX ORDER — ONDANSETRON 2 MG/ML
4 INJECTION INTRAMUSCULAR; INTRAVENOUS EVERY 6 HOURS PRN
Status: DISCONTINUED | OUTPATIENT
Start: 2021-01-01 | End: 2021-01-01 | Stop reason: HOSPADM

## 2021-01-01 RX ORDER — ACETAMINOPHEN 650 MG/1
650 SUPPOSITORY RECTAL EVERY 6 HOURS PRN
Status: DISCONTINUED | OUTPATIENT
Start: 2021-01-01 | End: 2021-01-01 | Stop reason: HOSPADM

## 2021-01-01 RX ORDER — RISPERIDONE 0.25 MG/1
0.25 TABLET, FILM COATED ORAL EVERY EVENING
Qty: 30 TABLET | Refills: 3 | Status: SHIPPED | OUTPATIENT
Start: 2021-01-01 | End: 2021-01-01 | Stop reason: SDUPTHER

## 2021-01-01 RX ORDER — FLECAINIDE ACETATE 50 MG/1
50 TABLET ORAL 2 TIMES DAILY
Qty: 60 TABLET | Status: CANCELLED | OUTPATIENT
Start: 2021-01-01

## 2021-01-01 RX ADMIN — FENTANYL CITRATE 25 MCG: 0.05 INJECTION, SOLUTION INTRAMUSCULAR; INTRAVENOUS at 09:42

## 2021-01-01 RX ADMIN — SODIUM CHLORIDE: 9 INJECTION, SOLUTION INTRAVENOUS at 07:13

## 2021-01-01 RX ADMIN — FENTANYL CITRATE 25 MCG: 0.05 INJECTION, SOLUTION INTRAMUSCULAR; INTRAVENOUS at 12:33

## 2021-01-01 RX ADMIN — SODIUM CHLORIDE, PRESERVATIVE FREE 10 ML: 5 INJECTION INTRAVENOUS at 07:08

## 2021-01-01 RX ADMIN — Medication 10 ML: at 21:27

## 2021-01-01 RX ADMIN — IOPAMIDOL 150 ML: 755 INJECTION, SOLUTION INTRAVENOUS at 07:07

## 2021-01-01 RX ADMIN — ONDANSETRON 4 MG: 2 INJECTION INTRAMUSCULAR; INTRAVENOUS at 09:41

## 2021-01-01 RX ADMIN — MORPHINE SULFATE 1 MG: 2 INJECTION, SOLUTION INTRAMUSCULAR; INTRAVENOUS at 21:08

## 2021-01-01 SDOH — ECONOMIC STABILITY: TRANSPORTATION INSECURITY
IN THE PAST 12 MONTHS, HAS THE LACK OF TRANSPORTATION KEPT YOU FROM MEDICAL APPOINTMENTS OR FROM GETTING MEDICATIONS?: NO

## 2021-01-01 SDOH — ECONOMIC STABILITY: FOOD INSECURITY: WITHIN THE PAST 12 MONTHS, YOU WORRIED THAT YOUR FOOD WOULD RUN OUT BEFORE YOU GOT MONEY TO BUY MORE.: NEVER TRUE

## 2021-01-01 ASSESSMENT — PAIN SCALES - GENERAL
PAINLEVEL_OUTOF10: 8
PAINLEVEL_OUTOF10: 0
PAINLEVEL_OUTOF10: 10

## 2021-01-01 ASSESSMENT — ENCOUNTER SYMPTOMS
SINUS PRESSURE: 0
SHORTNESS OF BREATH: 0
COUGH: 0
VOMITING: 0
ABDOMINAL PAIN: 0
SORE THROAT: 0
ABDOMINAL PAIN: 0

## 2021-01-01 ASSESSMENT — PAIN SCALES - WONG BAKER: WONGBAKER_NUMERICALRESPONSE: 8

## 2021-01-01 ASSESSMENT — LIFESTYLE VARIABLES: HOW OFTEN DO YOU HAVE A DRINK CONTAINING ALCOHOL: 0

## 2021-01-01 ASSESSMENT — PATIENT HEALTH QUESTIONNAIRE - PHQ9
SUM OF ALL RESPONSES TO PHQ QUESTIONS 1-9: 1

## 2021-01-01 ASSESSMENT — PAIN DESCRIPTION - LOCATION: LOCATION: OTHER (COMMENT)

## 2021-01-01 ASSESSMENT — PAIN DESCRIPTION - PAIN TYPE: TYPE: ACUTE PAIN

## 2021-01-11 NOTE — TELEPHONE ENCOUNTER
Send to me as refill request, this won't let me sign, states invalid provider for some reason and I can't change

## 2021-01-11 NOTE — TELEPHONE ENCOUNTER
Son Sera, she needs a refill of Flecainide 50 mg tab called into [facility] in [place]. He wants to switch all her prescriptions to [facility] in [place] from now on.

## 2021-01-25 NOTE — TELEPHONE ENCOUNTER
Suzanne Arndt called (on of Waleska's 5 kids) called to inform you that she has been depressed lately along with her alzheimer's and Mervat knew she had an appt upcoming since her siblings take turns taking care of her and wanted to get it addressed.

## 2021-01-28 NOTE — PATIENT INSTRUCTIONS
Personalized Preventive Plan for Burnett Litten - 1/28/2021  Medicare offers a range of preventive health benefits. Some of the tests and screenings are paid in full while other may be subject to a deductible, co-insurance, and/or copay. Some of these benefits include a comprehensive review of your medical history including lifestyle, illnesses that may run in your family, and various assessments and screenings as appropriate. After reviewing your medical record and screening and assessments performed today your provider may have ordered immunizations, labs, imaging, and/or referrals for you. A list of these orders (if applicable) as well as your Preventive Care list are included within your After Visit Summary for your review. Other Preventive Recommendations:    · A preventive eye exam performed by an eye specialist is recommended every 1-2 years to screen for glaucoma; cataracts, macular degeneration, and other eye disorders. · A preventive dental visit is recommended every 6 months. · Try to get at least 150 minutes of exercise per week or 10,000 steps per day on a pedometer . · Order or download the FREE \"Exercise & Physical Activity: Your Everyday Guide\" from [employer]. Call 9-336.306.8197 or search [employer] online. · You need 4544-4261 mg of calcium and 5215-1847 IU of vitamin D per day. It is possible to meet your calcium requirement with diet alone, but a vitamin D supplement is usually necessary to meet this goal.  · When exposed to the sun, use a sunscreen that protects against both UVA and UVB radiation with an SPF of 30 or greater. Reapply every 2 to 3 hours or after sweating, drying off with a towel, or swimming. · Always wear a seat belt when traveling in a car. Always wear a helmet when riding a bicycle or motorcycle.

## 2021-01-28 NOTE — PROGRESS NOTES
TONSILLECTOMY  1942         Family History   Problem Relation Age of Onset    Dementia Mother     Thyroid Disease Mother     Arthritis Father        CareTeam (Including outside providers/suppliers regularly involved in providing care):   Patient Care Team:  Jd Quijano MD as PCP - General (Family Medicine)  Jd Quijano [facility] as PCP [facility]    Wt Readings from Last 3 Encounters:   01/28/21 132 lb (59.9 kg)   01/13/20 130 lb 12.8 oz (59.3 kg)   11/08/19 129 lb (58.5 kg)     Vitals:    01/28/21 1315   BP: 110/70   Site: Left Upper Arm   Pulse: 82   Temp: 98.7 °F (37.1 °C)   SpO2: 97%   Weight: 132 lb (59.9 kg)   Height: 5' 3\" (1.6 m)     Body mass index is 23.38 kg/m². Based upon direct observation of the patient, evaluation of cognition reveals remote memory intact, recent memory impaired. Physical Exam:  /70 (Site: Left Upper Arm)   Pulse 82   Temp 98.7 °F (37.1 °C)   Ht 5' 3\" (1.6 m)   Wt 132 lb (59.9 kg)   LMP  (LMP Unknown)   SpO2 97%   Breastfeeding No   BMI 23.38 kg/m²     Gen: Well, NAD, Alert, Oriented x 3   HEENT: EOMI, eyes clear, MMM  Skin: without rash or jaundice  Neck: no significant lymphadenopathy or thyromegaly  Lungs: CTA B w/out Rales/Wheezes/Rhonchi, Good respiratory effort   Heart: RRR, S1S2, w/out M/R/G, non-displaced PMI   Ext: No C/C/E Bilaterally. Neuro: Neurovascularly intact w/ Sensory/Motor intact UE/LE Bilaterally. Psych: she is euthymic and appropriate at today's visit. Per family she will have days she is anxious/combative/withdrawn         Patient's complete Health Risk Assessment and screening values have been reviewed and are found in Flowsheets. The following problems were reviewed today and where indicated follow up appointments were made and/or referrals ordered. Positive Risk Factor Screenings with Interventions:      Cognitive:   Words recalled: 0 Words Recalled  Clock Drawing Test (CDT) Score: (!) Abnormal  Total Score Interpretation: Positive Mini-Cog  Cognitive Impairment Interventions:  · pt with known dementia, mild depression, had been on aricept in the past         General Health and ACP:  General  In general, how would you say your health is?: Good  In the past 7 days, have you experienced any of the following?  New or Increased Pain, New or Increased Fatigue, Loneliness, Social Isolation, Stress or Anger?: (!) New or Increased Fatigue  Do you get the social and emotional support that you need?: Yes  Do you have a Living Will?: Yes  Advance Directives     Power of  Living Will ACP-Advance Directive ACP-Power of     Not on File Not on File Not on File Not on File      General Health Risk Interventions:  Ongoing fatigue      Health Habits/Nutrition:  Health Habits/Nutrition  Do you exercise for at least 20 minutes 2-3 times per week?: Yes  Have you lost any weight without trying in the past 3 months?: No  Do you eat fewer than 2 meals per day?: No  Have you seen a dentist within the past year?: (!) No  Body mass index: 23.38  Health Habits/Nutrition Interventions:  · nothing needed at this time    Hearing/Vision:  No exam data present  Hearing/Vision  Do you or your family notice any trouble with your hearing?: (!) Yes  Do you have difficulty driving, watching TV, or doing any of your daily activities because of your eyesight?: No  Have you had an eye exam within the past year?: (!) No  Hearing/Vision Interventions:  · Has hearing aids/glasses    Safety:  Safety  Do you have working smoke detectors?: Yes  Have all throw rugs been removed or fastened?: (!) No  Do you have non-slip mats or surfaces in all bathtubs/showers?: Yes  Do all of your stairways have a railing or banister?: Yes  Are your doorways, halls and stairs free of clutter?: Yes  Do you always fasten your seatbelt when you are in a car?: Yes  Safety Interventions:  · Home safety tips provided     Personalized Preventive Plan   Current Health Maintenance Status  Immunization History   Administered Date(s) Administered    Influenza Vaccine, unspecified formulation 09/19/2014    Influenza Virus Vaccine 09/19/2014    Influenza Whole 10/01/2011    Influenza, High Dose (Fluzone 65 yrs and older) 10/18/2016, 09/11/2018, 09/30/2019    Pneumococcal Conjugate 13-valent (Stephy Marciano) 12/02/2015, 09/11/2018    Pneumococcal Polysaccharide ([id number]) 10/01/2010        Health Maintenance   Topic Date Due    COVID-19 Vaccine (1 of 2) 03/24/1956    DTaP/Tdap/Td vaccine (1 - Tdap) 03/24/1957    Shingles Vaccine (1 of 2) 03/24/1988    Annual Wellness Visit (AWV)  05/29/2019    Flu vaccine (1) 09/01/2020    TSH testing  01/13/2021    DEXA (modify frequency per FRAX score)  Completed    Pneumococcal 65+ years Vaccine  Completed    Hepatitis A vaccine  Aged Out    Hepatitis B vaccine  Aged Out    Hib vaccine  Aged Out    Meningococcal (ACWY) vaccine  Aged Out     Recommendations for SP3H Due: see orders and patient instructions/AVS.  . Recommended screening schedule for the next 5-10 years is provided to the patient in written form: see Patient Carmen Diego was seen today for medicare awv. Diagnoses and all orders for this visit:    Routine general medical examination at a health care facility    Rheumatoid arthritis, involving unspecified site, unspecified whether rheumatoid factor present [facility])    Atrial fibrillation, unspecified type ([facility] [address]  -     CBC; Future  -     Comprehensive Metabolic Panel; Future    Mixed memory disorder    Hypothyroidism, unspecified type  -     TSH without Reflex; Future    Dysthymia  -     mirtazapine (REMERON) 15 MG tablet; Take 1 tablet by mouth nightly    Lipid screening  -     Lipid Panel; Future    Vitamin D deficiency  -     Vitamin D 25 Hydroxy;  Future

## 2021-02-09 NOTE — PATIENT INSTRUCTIONS
Seborrheic dermatitis  Seborrheic dermatitis is a common, chronic or relapsing form of eczema/dermatitis that mainly affects the sebaceous, gland-rich regions of the scalp, face, and trunk . The cause of seborrhoeic dermatitis is not completely understood. It is associated with proliferation of various species of the skin commensal Malassezia, in its yeast (non-pathogenic) form. Treatment:   Obtain Selsun blue shampoo (original shampoo). Apply this shampoo to a wet scalp and massage in. Let the solution soak in the scalp for 3-5 minutes. If this is affecting the upper face and eyebrows you may also use the shampoo on this area. Rinse off and bathe as usual.  Obtain a soft bristle brush (like a baby brush) to use on the scalp once done bathing. Use while the skin is still moist as this will help remove the thickened, flaking skin. Avoid hair products with high alcohol content. Once scalp symptoms improve, you can continue using the selsun blue as maintenance 2-3 times a week. Seborrheic Dermatitis: Care Instructions  Your Care Instructions  Seborrheic dermatitis (say \"ish-vop-ATW-ick dqu-tvs-BA-tus\") is a skin problem that causes a reddish rash with greasy, flaky, yellow skin patches. The rash may appear on many parts of the body. It may be on the scalp, face (especially the eyebrow area and between the nose and mouth), ears, breasts, underarms, and genital area. The flaky skin on the scalp is called dandruff. This rash is often a long-term (chronic) condition. It may last for years. But the symptoms may come and go. Symptoms can be treated with special creams, shampoos, or other skin care. The cause of seborrheic dermatitis is not fully understood. It may occur when skin glands make too much oil. It may get worse in cold weather or with stress. A type of skin fungus, or yeast, may also be linked with this condition. Follow-up care is a key part of your treatment and safety.  Be sure to make and go to all appointments, and call your doctor if you are having problems. It's also a good idea to know your test results and keep a list of the medicines you take. How can you care for yourself at home? · If your doctor prescribes a steroid cream, dandruff shampoo, or antifungal cream or medicine, use it as directed. If your doctor prescribes other medicine, take it as directed. · Use a dandruff shampoo if seborrheic dermatitis affects your scalp. This includes Head & Shoulders, Sebulex, and Selsun Blue. You may need to try a few kinds of shampoo to find the one that works best for you. · To help with itching:  ? Use hydrocortisone cream. Follow the directions on the label. ? Use cold, wet cloths. ? Take an over-the-counter antihistamine, such as diphenhydramine (Benadryl) or loratadine (Claritin). Read and follow all instructions on the label. When should you call for help? Call your doctor now or seek immediate medical care if:  · You have signs of infection, such as:  ? Increased pain, swelling, warmth, or redness. ? Red streaks leading from the rash. ? Pus draining from the rash. ? A fever. Watch closely for changes in your health, and be sure to contact your doctor if:  · The rash gets worse or spreads to other parts of your body. · You do not get better as expected. Where can you learn more? Go to https://[employer] org and sign in to your [employer] account. Enter O459 in the [facility] box to learn more about \"Seborrheic Dermatitis: Care Instructions. \"     If you do not have an account, please click on the \"Sign Up Now\" link. Current as of: October 31, 2019               Content Version: 12.5  © 5380-8746 [employer]. Care instructions adapted under license by [facility] ([facility]).  If you have questions about a medical condition or this instruction, always ask your healthcare professional. Cee Antonio disclaims any warranty or liability for your use of this information.

## 2021-02-09 NOTE — PROGRESS NOTES
 Not on file        Family History   Problem Relation Age of Onset    Dementia Mother     Thyroid Disease Mother     Arthritis Father        Vitals:    02/09/21 1532   BP: 124/76   Pulse: 90   Temp: 99.4 °F (37.4 °C)   SpO2: 99%   Weight: 137 lb (62.1 kg)   Height: 5' 3\" (1.6 m)       Estimated body mass index is 24.27 kg/m² as calculated from the following:    Height as of this encounter: 5' 3\" (1.6 m). Weight as of this encounter: 137 lb (62.1 kg). No results for input(s): WBC, RBC, HGB, HCT, MCV, MCH, MCHC, RDW, PLT, MPV in the last 72 hours. No results for input(s): NA, K, CL, CO2, BUN, CREATININE, GLUCOSE, CALCIUM, PROT, LABALBU, BILITOT, ALKPHOS, AST, ALT in the last 72 hours. No results found for: LABA1C    No results found. Physical Exam  Constitutional:       Appearance: Normal appearance. She is normal weight. HENT:      Head: Normocephalic and atraumatic. Eyes:      Extraocular Movements: Extraocular movements intact. Conjunctiva/sclera: Conjunctivae normal.   Skin:     General: Skin is warm. Capillary Refill: Capillary refill takes less than 2 seconds. Findings: No rash. Comments: Thick yellow crusted scale above the medial eyebrows with mild scaling between the brows   Neurological:      Mental Status: She is alert. Psychiatric:         Mood and Affect: Mood normal.         Thought Content: Thought content normal.         Judgment: Judgment normal.             ASSESSMENT/PLAN:  1. Seborrheic dermatitis  - Pt to return if symptoms worsen or do not improve with ketoconazole shampoo    - ketoconazole (NIZORAL) 2 % shampoo; Use daily on affected areas. Apply for 5 minutes then wash off. Use daily until rash improves, then 2x week for maintenance. Dispense: 1 Bottle;  Refill: 1      ---------------------------------------------------------------------  Side effects, adverse effects of the medication prescribed today, as well as treatment plan/ rationale and result expectations have been discussed with the patient who expresses understanding and desires to proceed. Close follow up to evaluate treatment results and for coordination of care. I have reviewed the patient's medical history in detail and updated the computerized patient record. As always, patient is advised that if symptoms worsen in any way they will proceed to the nearest emergency room. --------------------------------------------------------------------    Return if symptoms worsen or fail to improve. An  electronic signature was used to authenticate this note.     --Ivette Mensah DO on 2/9/2021 at 3:51 PM

## 2021-05-10 NOTE — TELEPHONE ENCOUNTER
pts son Azra Alfonsos calling stating that the flecainide needs a PA. Received call from pharmacy. Please advise status. Pt phone number is [phone].

## 2021-05-19 NOTE — PROGRESS NOTES
Chief Complaint   Patient presents with    Anxiety     check up     Shahid Vance is a 78 y.o. female    Follow up anxiety/agitation     Tapering off remeron  On 7.5 QOD at the moment     appt notes say patient more anxious/aggressive in the evening    Wt Readings from Last 3 Encounters:   05/19/21 139 lb 6.4 oz (63.2 kg)   02/09/21 137 lb (62.1 kg)   01/28/21 132 lb (59.9 kg)         Patient Active Problem List   Diagnosis    Hypothyroidism    RA [facility]) ([facility] [address]    History of shingles    Hearing loss    Dependent edema    Post-nasal drip    Weight loss    Depression    Poor appetite    A-fib ([facility] [address]    Osteoporosis    Memory loss    Senile osteoporosis    [facility]       has a current medication list which includes the following prescription(s): risperidone, flecainide, multiple vitamin, aspirin, and oyster shell calcium/d. Patient's medications, allergies, past medical, surgical, social and family histories were reviewed and updated as appropriate. Review of Systems:   General/Endocrine ROS: overall feeling well  Psych: see HPI  Respiratory ROS: no cough, shortness of breath, or wheezing  Cardiovascular ROS: no chest pain or dyspnea on exertion  Gastrointestinal ROS: no abdominal pain, change in bowel habits, or black or bloody stools  Genito-Urinary ROS: no dysuria, trouble voiding  Musculoskeletal ROS:  hx RA on no DMARD, some right hip/leg pain  Neurological ROS: negative for - behavioral changes, memory loss, numbness/tingling, tremors or weakness  Skin: denies worrisome moles    In general patient otherwise reports feeling well.      Physical Exam:  /68 (Site: Left Upper Arm)   Pulse 81   Temp 98.6 °F (37 °C)   Ht 5' 3\" (1.6 m)   Wt 139 lb 6.4 oz (63.2 kg)   LMP  (LMP Unknown)   SpO2 96%   Breastfeeding No   BMI 24.69 kg/m²     Gen:thin, but well appearing, NAD, Alert, Oriented x 3   HEENT: EOMI, eyes clear, MMM  Skin: without rash or jaundice  Neck: no significant lymphadenopathy or thyromegaly  Lungs: CTA B w/out Rales/Wheezes/Rhonchi, Good respiratory effort   Heart: RRR, S1S2, w/out M/R/G, non-displaced PMI   Ext: trace to 1+ pedal edema bilaterally. Neuro: Neurovascularly intact w/ Sensory/Motor intact UE/LE Bilaterally. Psych: she makes good eye contact, but flat affect, quiet. A&P   Diagnosis Orders   1. Hypothyroidism, unspecified type  External Referral to Internal Medicine   2. Mixed dementia [facility]  External Referral to Internal Medicine   3.  Agitation  risperiDONE (RISPERDAL) 0.25 MG tablet    External Referral to Internal Medicine       Annual checkup with [address] only on asa for PAF, but she remains in NSR    Trial of low dose risperdal     Geriatric clinic referral, Curtis Aguilera MD

## 2021-07-28 NOTE — TELEPHONE ENCOUNTER
Pt's daughter calling the script for risperdal today says to take in AM  Pt has been on the in the evening  Not sure if you wanted that changed?         Poonam Monsalve [phone]

## 2021-07-28 NOTE — PROGRESS NOTES
SpO2 98%   Breastfeeding No   BMI 23.21 kg/m²     Gen:thin, but well appearing, NAD, Alert, Oriented x 3   HEENT: EOMI, eyes clear, MMM  Skin: without rash or jaundice  Neck: no significant lymphadenopathy or thyromegaly  Lungs: CTA B w/out Rales/Wheezes/Rhonchi, Good respiratory effort   Heart: RRR, S1S2, w/out M/R/G, non-displaced PMI   Ext: trace to 1+ pedal edema bilaterally. Neuro: Neurovascularly intact w/ Sensory/Motor intact UE/LE Bilaterally. Psych: she makes good eye contact, but flat affect, quiet. A&P   Diagnosis Orders   1. Agitation  risperiDONE (RISPERDAL) 0.5 MG tablet   2. Hypothyroidism, unspecified type     3.  Sleep disturbance         Annual checkup with [address] only on asa for PAF, but she remains in NSR    Increase risperdal    F/u with geriatrics    Low dose melatonin          Saul Rosales MD

## 2021-08-13 PROBLEM — I63.9 ACUTE CVA (CEREBROVASCULAR ACCIDENT) (HCC): Status: ACTIVE | Noted: 2021-01-01

## 2021-08-13 NOTE — CONSULTS
Smoker     Packs/day: 0.25     Years: 6.00     Pack years: 1.50     Types: Cigarettes     Quit date: 11/10/1973     Years since quittin.7    Smokeless tobacco: Never Used   Substance and Sexual Activity    Alcohol use: Yes     Comment: rare    Drug use: No    Sexual activity: Not on file   Other Topics Concern    Not on file   Social History Narrative    Not on file     Social Determinants of Health     Financial Resource Strain: Low Risk     Difficulty of Paying Living Expenses: Not hard at all   Food Insecurity: No Food Insecurity    Worried About Running Out of Food in the Last Year: Never true    [place] of Food in the Last Year: Never true   Transportation Needs: No Transportation Needs    Lack of Transportation (Medical): No    Lack of Transportation (Non-Medical):  No   Physical Activity:     Days of Exercise per Week:     Minutes of Exercise per Session:    Stress:     Feeling of Stress :    Social Connections:     Frequency of Communication with Friends and Family:     Frequency of Social Gatherings with Friends and Family:     Attends Alevism Services:     Active Member of Clubs or Organizations:     Attends Club or Organization Meetings:     Marital Status:    Intimate Partner Violence:     Fear of Current or Ex-Partner:     Emotionally Abused:     Physically Abused:     Sexually Abused:      Family History   Problem Relation Age of Onset    Dementia Mother     Thyroid Disease Mother     Arthritis Father      Allergies   Allergen Reactions    Bupivacaine Hcl     Erythromycin     Histamine     Percocet [Oxycodone-Acetaminophen]     Procaine Hcl     Vicodin [Hydrocodone-Acetaminophen]      Current Facility-Administered Medications   Medication Dose Route Frequency Provider Last Rate Last Admin    0.9 % sodium chloride infusion   Intravenous Continuous Aby Rangel MD   Stopped at 21 [id number]    ondansetron (ZOFRAN) 4 MG/2ML injection             sodium chloride flush 0.9 % injection 5-40 mL  5-40 mL Intravenous 2 times per day [facility] DO        sodium chloride flush 0.9 % injection 5-40 mL  5-40 mL Intravenous PRN [facility] DO        0.9 % sodium chloride infusion  25 mL Intravenous PRN [facility] DO        ondansetron (ZOFRAN-ODT) disintegrating tablet 4 mg  4 mg Oral Q8H PRN [facility] DO        Or    ondansetron [facility]) injection 4 mg  4 mg Intravenous Q6H PRN [facility] DO        polyethylene glycol (GLYCOLAX) packet 17 g  17 g Oral Daily PRN [facility] DO        acetaminophen (TYLENOL) tablet 650 mg  650 mg Oral Q6H PRN [facility] DO        Or    acetaminophen (TYLENOL) suppository 650 mg  650 mg Rectal Q6H PRN [facility] DO            Objective:   BP (!) 86/56   Pulse 112   Temp 98.7 °F (37.1 °C) (Axillary)   Resp 24   Ht 5' 3\" (1.6 m) Comment: Per daughter  Wt 130 lb (59 kg)   LMP  (LMP Unknown)   SpO2 100%   BMI 23.03 kg/m²     Physical Exam patient is comatose and breathing heavily pupils are pinpoint doll's eyes are absent. No pain response is notable except for triple response in lower extremities areflexic cardiovascular system S1 and S2 are normal no murmurs appreciated no Bruits    XR PELVIS (1-2 VIEWS)    Result Date: 8/13/2021  EXAMINATION: XR PELVIS (1-2 VIEWS), 8/13/2021 6:25 AM CLINICAL HISTORY:  trauma portable COMPARISON: None TECHNIQUE: Single AP view PELVIS FINDINGS:  There are no lytic or sclerotic bone lesions. There are nondisplaced fractures of the proximal left superior pubic ramus and of the left inferior pubic ramus. The femoral heads are located. Status post sliding screw fixation of the right femoral neck. Evaluation of the sacrum is limited due to overlying bowel gas. The soft tissues are within normal limits. There are nondisplaced fractures of proximal left superior and inferior pubic rami.      CT Head WO Contrast    Result Date: 8/13/2021  CT HEAD WO CONTRAST : 8/13/2021 CLINICAL HISTORY:  Unresponsive now, original CT head negative . COMPARISON: Noncontrast head CT from earlier 8/13/2021; and interval contrast-enhanced studies. TECHNIQUE: Spiral unenhanced images were obtained of the head, with routine multiplanar reconstructions performed. All CT scans at this facility use dose modulation, iterative reconstruction, and/or weight based dosing when appropriate to reduce radiation dose to as low as reasonably achievable. FINDINGS: IV contrast is present from earlier today. There is no intracranial hemorrhage, mass effect, midline shift, extra-axial collection, evidence of hydrocephalus, recent ischemic infarct, or skull fracture identified. Mild generalized cerebral volume loss and mild white matter changes are again noted. The mastoid air cells and visualized paranasal sinuses are essentially clear. NO ACUTE INTRACRANIAL PROCESS OR SIGNIFICANT CHANGE FROM EARLIER 8/13/2021 IDENTIFIED. CT HEAD WO CONTRAST    Result Date: 8/13/2021  CT HEAD WO CONTRAST : 8/13/2021 CLINICAL HISTORY: Pain after fall. COMPARISON: 5/10/2019. TECHNIQUE: Spiral unenhanced images were obtained of the head, with routine multiplanar reconstructions performed. All CT scans at this facility use dose modulation, iterative reconstruction, and/or weight based dosing when appropriate to reduce radiation dose to as low as reasonably achievable. FINDINGS: There is no intracranial hemorrhage, mass effect, midline shift, extra-axial collection, evidence of hydrocephalus, recent ischemic infarct, or skull fracture identified. Mild generalized cerebral volume loss and mild white matter changes are again noted. The mastoid air cells and visualized paranasal sinuses are essentially clear. NO ACUTE INTRACRANIAL PROCESS OR SIGNIFICANT CHANGE FROM 5/10/2019 IDENTIFIED.       CTA NECK W WO CONTRAST    Result Date: 8/13/2021  CTA NECK W WO CONTRAST : 8/13/2021 CLINICAL HISTORY: Spine fractures after falling. COMPARISON: Cervical spine CT from earlier 8/13/2021. TECHNIQUE: Spiral images were obtained of the neck after the uneventful intravenous administration of approximately 100 mL of Isovue-370 contrast with CTA protocol. Internal carotid narrowings are estimated using NASCET criteria. Routine and volume rendered images were obtained on a 3-dimensional workstation. All CT scans at this facility use dose modulation, iterative reconstruction, and/or weight based dosing when appropriate to reduce radiation dose to as low as reasonably achievable. FINDINGS: Fractures of C7, T1 and T2 noted on the cervical spine CT are present. There is no flow-limiting stenosis, dissection, or organized hematoma, or other significant posttraumatic complication identified in the neck. FINAL IMPRESSION: NO FLOW-LIMITING STENOSIS, DISSECTION OR EVIDENCE OF BCVI IDENTIFIED. CT CHEST W CONTRAST    Result Date: 8/13/2021  CT CHEST W CONTRAST, CT ABDOMEN PELVIS W IV CONTRAST, CT LUMBAR SPINE WO CONTRAST, CT THORACIC SPINE WO CONTRAST : 8/13/2021 CLINICAL HISTORY:  fall > 10ft . COMPARISON: None available. TECHNIQUE: Spiral images were obtained of the chest, abdomen and pelvis after the uneventful intravenous administration of approximately 100 mL of Isovue-370 contrast. Spiral imaging of the thoracic and lumbar spine were also obtained. Routine multiplanar reconstructions were performed for each study. All CT scans at this facility use dose modulation, iterative reconstruction, and/or weight based dosing when appropriate to reduce radiation dose to as low as reasonably achievable. CHEST CT FINDINGS: Thoracic spine fractures are noted, as described in the thoracic spine study. Mild to moderate changes probable dependent atelectasis is present. There are no displaced rib fractures, significant hematoma, pulmonary contusion, pneumothorax, pleural or pericardial effusion.  ABDOMEN AND PELVIS CT FINDINGS: Mildly displaced predominantly horizontal fractures are present of the mid distal sacrum. Nondisplaced fractures are present of the mid left inferior pubic ramus and junction of the left superior and acetabulum, without extension to the articular surface. Mild to moderate ill-defined density within the subcutaneous fat posterior to the left ischial tuberosity is consistent with a small posttraumatic hematoma/ecchymosis. There is no evidence of solid organ injury or free fluid. A healed right femoral neck fracture with screws is noted. THORACIC SPINE CT FINDINGS: A mildly displaced small avulsion fracture is identified of the posterior inferior T1 spinous process. A mild compression fracture of T2 is present, with nondisplaced horizontal extension into the lamina and base of the spinous process. A minimally depressed fracture of the superior endplate of T7 is probably acute. A mild compression fracture of T3 1 be old and healed. There is no dislocation, or other acute fractures identified. LUMBAR SPINE CT FINDINGS: A minimally depressed fracture of the superior endplate of L1 is probably acute. Mildly displaced fractures of the mid to distal sacrum are noted There is no dislocation or other fractures identified. Mild to moderate degenerative changes are present, predominantly of the thoracolumbar junction. FINAL IMPRESSION: ACUTE T1, T2, T7, L1, SACRAL AND LEFT PUBIC RAMI FRACTURES, AS DESCRIBED. PROBABLY CHRONIC HEALED MILD T3 COMPRESSION FRACTURE. SMALL SUBCUTANEOUS FAT LEFT BUTTOCK HEMATOMA/ECCHYMOSIS NO EVIDENCE OF SOLID ORGAN INJURY OR OTHER POSTTRAUMATIC COMPLICATION IDENTIFIED. CT CERVICAL SPINE WO CONTRAST    Result Date: 8/13/2021  CT CERVICAL SPINE WO CONTRAST: 8/13/2021 CLINICAL HISTORY: Pain after fall >10ft. COMPARISON: 5/10/2019.  TECHNIQUE: ROUTINE All CT scans at this facility use dose modulation, iterative reconstruction, and/or weight based dosing when appropriate to reduce radiation dose to as low as reasonably achievable. FINDINGS: The spine is visualized from the craniovertebral junction nearly through the T2 level. A minimally displaced fracture of the superior endplate of C7 is present, extending through the posterior aspect of the vertebral body on the left. A fracture of the inferior endplate of T2 is present with mild loss of height and nondisplaced horizontal extension of fractures through the lamina into the base of the spinous process. A mildly displaced small avulsion fracture is noted of the posterior inferior aspect of the T1 spinous process. There is no dislocation, or acute paraspinous soft tissue abnormalities identified. Mild degenerative changes have not significantly changed from the prior study. MILDLY DISPLACED FRACTURE OF THE SUPERIOR ENDPLATE OF C7. MILD COMPRESSION FRACTURE T2 WITH NONDISPLACED HORIZONTAL EXTENSION THROUGH THE LAMINA AND BASE OF THE SPINOUS PROCESS. SMALL AVULSION FRACTURE OF THE POSTERIOR INFERIOR T1 SPINOUS PROCESS. MILD CERVICAL SPONDYLOSIS. CT THORACIC SPINE WO CONTRAST    Result Date: 8/13/2021  CT CHEST W CONTRAST, CT ABDOMEN PELVIS W IV CONTRAST, CT LUMBAR SPINE WO CONTRAST, CT THORACIC SPINE WO CONTRAST : 8/13/2021 CLINICAL HISTORY:  fall > 10ft . COMPARISON: None available. TECHNIQUE: Spiral images were obtained of the chest, abdomen and pelvis after the uneventful intravenous administration of approximately 100 mL of Isovue-370 contrast. Spiral imaging of the thoracic and lumbar spine were also obtained. Routine multiplanar reconstructions were performed for each study. All CT scans at this facility use dose modulation, iterative reconstruction, and/or weight based dosing when appropriate to reduce radiation dose to as low as reasonably achievable. CHEST CT FINDINGS: Thoracic spine fractures are noted, as described in the thoracic spine study. Mild to moderate changes probable dependent atelectasis is present.  There are no displaced rib fractures, significant COMPARISON: None available. TECHNIQUE: Spiral images were obtained of the chest, abdomen and pelvis after the uneventful intravenous administration of approximately 100 mL of Isovue-370 contrast. Spiral imaging of the thoracic and lumbar spine were also obtained. Routine multiplanar reconstructions were performed for each study. All CT scans at this facility use dose modulation, iterative reconstruction, and/or weight based dosing when appropriate to reduce radiation dose to as low as reasonably achievable. CHEST CT FINDINGS: Thoracic spine fractures are noted, as described in the thoracic spine study. Mild to moderate changes probable dependent atelectasis is present. There are no displaced rib fractures, significant hematoma, pulmonary contusion, pneumothorax, pleural or pericardial effusion. ABDOMEN AND PELVIS CT FINDINGS: Mildly displaced predominantly horizontal fractures are present of the mid distal sacrum. Nondisplaced fractures are present of the mid left inferior pubic ramus and junction of the left superior and acetabulum, without extension to the articular surface. Mild to moderate ill-defined density within the subcutaneous fat posterior to the left ischial tuberosity is consistent with a small posttraumatic hematoma/ecchymosis. There is no evidence of solid organ injury or free fluid. A healed right femoral neck fracture with screws is noted. THORACIC SPINE CT FINDINGS: A mildly displaced small avulsion fracture is identified of the posterior inferior T1 spinous process. A mild compression fracture of T2 is present, with nondisplaced horizontal extension into the lamina and base of the spinous process. A minimally depressed fracture of the superior endplate of T7 is probably acute. A mild compression fracture of T3 1 be old and healed. There is no dislocation, or other acute fractures identified. LUMBAR SPINE CT FINDINGS: A minimally depressed fracture of the superior endplate of L1 is probably acute. Mildly displaced fractures of the mid to distal sacrum are noted There is no dislocation or other fractures identified. Mild to moderate degenerative changes are present, predominantly of the thoracolumbar junction. FINAL IMPRESSION: ACUTE T1, T2, T7, L1, SACRAL AND LEFT PUBIC RAMI FRACTURES, AS DESCRIBED. PROBABLY CHRONIC HEALED MILD T3 COMPRESSION FRACTURE. SMALL SUBCUTANEOUS FAT LEFT BUTTOCK HEMATOMA/ECCHYMOSIS NO EVIDENCE OF SOLID ORGAN INJURY OR OTHER POSTTRAUMATIC COMPLICATION IDENTIFIED. CT ABDOMEN PELVIS W IV CONTRAST Additional Contrast? None    Result Date: 8/13/2021  CT CHEST W CONTRAST, CT ABDOMEN PELVIS W IV CONTRAST, CT LUMBAR SPINE WO CONTRAST, CT THORACIC SPINE WO CONTRAST : 8/13/2021 CLINICAL HISTORY:  fall > 10ft . COMPARISON: None available. TECHNIQUE: Spiral images were obtained of the chest, abdomen and pelvis after the uneventful intravenous administration of approximately 100 mL of Isovue-370 contrast. Spiral imaging of the thoracic and lumbar spine were also obtained. Routine multiplanar reconstructions were performed for each study. All CT scans at this facility use dose modulation, iterative reconstruction, and/or weight based dosing when appropriate to reduce radiation dose to as low as reasonably achievable. CHEST CT FINDINGS: Thoracic spine fractures are noted, as described in the thoracic spine study. Mild to moderate changes probable dependent atelectasis is present. There are no displaced rib fractures, significant hematoma, pulmonary contusion, pneumothorax, pleural or pericardial effusion. ABDOMEN AND PELVIS CT FINDINGS: Mildly displaced predominantly horizontal fractures are present of the mid distal sacrum. Nondisplaced fractures are present of the mid left inferior pubic ramus and junction of the left superior and acetabulum, without extension to the articular surface.  Mild to moderate ill-defined density within the subcutaneous fat posterior to the left ischial tuberosity is consistent with a small posttraumatic hematoma/ecchymosis. There is no evidence of solid organ injury or free fluid. A healed right femoral neck fracture with screws is noted. THORACIC SPINE CT FINDINGS: A mildly displaced small avulsion fracture is identified of the posterior inferior T1 spinous process. A mild compression fracture of T2 is present, with nondisplaced horizontal extension into the lamina and base of the spinous process. A minimally depressed fracture of the superior endplate of T7 is probably acute. A mild compression fracture of T3 1 be old and healed. There is no dislocation, or other acute fractures identified. LUMBAR SPINE CT FINDINGS: A minimally depressed fracture of the superior endplate of L1 is probably acute. Mildly displaced fractures of the mid to distal sacrum are noted There is no dislocation or other fractures identified. Mild to moderate degenerative changes are present, predominantly of the thoracolumbar junction. FINAL IMPRESSION: ACUTE T1, T2, T7, L1, SACRAL AND LEFT PUBIC RAMI FRACTURES, AS DESCRIBED. PROBABLY CHRONIC HEALED MILD T3 COMPRESSION FRACTURE. SMALL SUBCUTANEOUS FAT LEFT BUTTOCK HEMATOMA/ECCHYMOSIS NO EVIDENCE OF SOLID ORGAN INJURY OR OTHER POSTTRAUMATIC COMPLICATION IDENTIFIED. XR CHEST PORTABLE    Result Date: 8/13/2021  EXAMINATION: XR CHEST PORTABLE CLINICAL HISTORY: 78YEAR-OLD. FELL FROM SECOND-STORY WINDOW. COMPARISONS: JANUARY 19, 2017 FINDINGS: Osseous structures intact. Cardiopericardial silhouette normal. Lungs clear. NO ACUTE CARDIOPULMONARY DISEASE.    MRI BRAIN WO CONTRAST    Result Date: 8/13/2021  EXAMINATION: MRI BRAIN WO CONTRAST CLINICAL HISTORY:  Unresponsive COMPARISONS: NONE AVAILABLE TECHNIQUE: Multiplanar multisequence images of the brain were obtained without contrast. Diffusion perfusion imaging was obtained.  FINDINGS: There are several small scattered foci of diffusion artifact in the cortex of both right and left hemispheres which are worrisome for acute ischemia. In the right frontal lobe there is a 4 mm area of abnormality on diffusion imaging and FLAIR images consistent with acute ischemia. Small similar foci are seen in the cortex of the right frontal lobe and also the left frontal lobe in the precentral gyrus. There is no associated edema or mass effect. There is no evidence of  The susceptibility images do not demonstrate evidence of hemosiderin deposition within the brain parenchyma or the leptomeninges. There is preservation of the gray-white matter differentiation. There is prominence of the sulci and ventricles consistent with moderate global cerebral atrophy and chronic involutional changes. The midline structures are intact, the corpus callosum is within normal limits. The region of the pineal gland and the sella turcica are unremarkable. There is an incidental 9 x 11 mm meningioma along the right side of the falx. There are no space-occupying lesions in the posterior fossa. The basilar cisterns are patent. The craniocervical junction is unremarkable. The visualized portions of the orbits are within normal limits, the globes are intact. The visualized portions of the paranasal sinuses are within normal limits. The calvarium is unremarkable. There is a 5 cm curvilinear subcutaneous fluid collectiond at the posterior apex consistent with a small subcutaneous scalp hematoma. There are several small millimeter or less, foci of acute ischemia in the cortex of both the right and left frontal lobes. The findings may represent embolic phenomenon versus a hypotensive episode. There are chronic involutional changes with moderate atrophy usually associated with chronic microangiopathy. There is a 5 cm subcutaneous fluid collection in the posterior scalp consistent with a hematoma. The findings were called to ER at 1132 hours and Keyla Arriaga took a message.       Lab Results   Component Value Date    WBC 10.6 08/13/2021    RBC 3.87 08/13/2021    HGB 10.9 08/13/2021    HCT 33.7 08/13/2021    MCV 87.3 08/13/2021    MCH 29.9 08/13/2021    MCHC 34.2 08/13/2021    RDW 13.8 08/13/2021     08/13/2021    MPV 8.7 03/27/2014     Lab Results   Component Value Date     08/13/2021    K 3.5 08/13/2021     08/13/2021    CO2 24 08/13/2021    BUN 13 08/13/2021    CREATININE 0.9 08/13/2021    CREATININE 0.61 08/13/2021    GFRAA >60 08/13/2021    LABGLOM 60 08/13/2021    GLUCOSE 117 08/13/2021    PROT 7.0 08/13/2021    LABALBU 4.1 08/13/2021    CALCIUM 9.4 08/13/2021    BILITOT 0.9 08/13/2021    ALKPHOS 77 08/13/2021    AST 55 08/13/2021    ALT 71 08/13/2021     Lab Results   Component Value Date    PROTIME 14.8 08/13/2021    INR 1.2 08/13/2021     Lab Results   Component Value Date    TSH 2.110 02/10/2021    BDKETGEH55 485 01/19/2017    FOLATE 16.3 01/19/2017     Lab Results   Component Value Date    TRIG 64 02/10/2021    HDL 59 02/10/2021    LDLCALC 90 02/10/2021     Lab Results   Component Value Date    ETOH <10 08/13/2021     No results found for: LITHIUM, DILFRTOT, VALPROATE    Assessment:      Coma with a [place] Coma Scale of 1-3. Patient is unresponsive with heavy breathing as well as hypotension. I was consulted twice from the emergency room regarding her follow-up for prognosis and was supposed to see her in the emergency room though she had to be admitted as we wer not quite sure of what the outcome was going to be. She came in unresponsive and then by the time she came back from the CT room she became unresponsive there was no reported seizure. Repeat CT did not show any significant I recommend an MRI of the brain which was done urgently and we have reviewed this and she has small tiny multiple emboli. IV fat emboli. Given the nature of her presentation this likely is comatose state secondary to fat emboli or micro emboli which is associated with fat emboli. Now in coma. Her prognosis is poor.   Recommended hospice care.  Patient now has also hypoxic ischemic encephalopathy. Patient had extensive radiological evaluations which are reviewed. This consultation includes my consultations twice with the emergency room today. Met with the family and detailed them of the above with the poor outcome. Clinical care time of 110 minutes  Ashkan Marie MD, Abhishek Marcus, American Board of Psychiatry & Neurology  Board Certified in Vascular Neurology  Board Certified in Neuromuscular Medicine  Certified in [address] [place] [address] Ave:

## 2021-08-13 NOTE — ED NOTES
Dr. Alicia Nj attempted to updated family at this time. Waiting for family member to return.       Cora Lance, RN  08/13/21 Shellie Baer RN  08/13/21 [id number]

## 2021-08-13 NOTE — H&P
Department of Internal Medicine  General Internal Medicine  Attending History and Physical      CHIEF COMPLAINT:  Fall out of 2nd story window    Reason for Admission:  Acute bilateral CVA, multiple vertebral fractures    History Obtained From:  electronic medical record, reason patient could not give history:  altered mental status    HISTORY OF PRESENT ILLNESS:      The patient is a 78 y.o. female with significant past medical history of dementia, hypothyroid and RA who presents after a fall out of a second story window >10 ft to the ground. Pt lives with her daughter and is normally able to ambulate and function on her own. The fall was witnessed by a neighbor and the daughter heard the noise and her mom complaining of arm pain. There was not initially alteration in mental status. She was brought to the ED as a Level 1 trauma. Workup in the ED showed multiple vertebral fractures at T1, T2, T7, L1, sacral and L pubic rami fractures. These were evaluated by trauma, ortho and neurosurgery and all agreed that none were operable. CT head was negative and labs were largely unremarkable. Pt was alert at this time. Plan was to admit to the trauma service, but while in the ED, patient became acutely obtunded. Repeat CT head was negative for bleeding. Neurology was contacted from the ED and STAT MRI was ordered and showed multiple bilateral frontal lobe infarcts. Pt with abnormal breathing and Dr. Rebolledo Render recommended intubating the patient for airway protection, but family states patient did not want that and decided on comfort measures. Hospice was brought up and family requested that she be seen by neurology for prognosis prior to deciding on hospice. Pt made DNR-CC. Trauma signed off and patient admitted to the hospitalist service to the floor and Neuro consulted.      Past Medical History:        Diagnosis Date    Dependent edema     Hearing loss     wears hearing aids    History of shingles     Hypothyroidism     Memory loss 01/11/2019    Mixed dementia ([facility] [address]     RA [facility]rheumatoid arthritis) ([facility]     really is asymptomatic, not on any DMARDS     Past Surgical History:        Procedure Laterality Date    ABLATION OF DYSRHYTHMIC FOCUS  1997    CARPAL TUNNEL RELEASE      R    CATARACT REMOVAL WITH IMPLANT  2011    bilateral    COLONOSCOPY  2004    TONSILLECTOMY  1940         Medications Prior to Admission:    Not in a hospital admission. Allergies:  Bupivacaine hcl, Erythromycin, Histamine, Percocet [oxycodone-acetaminophen], Procaine hcl, and Vicodin [hydrocodone-acetaminophen]    Social History:    Former smoker, occ etoh, no drugs    Family History:       Problem Relation Age of Onset    Dementia Mother     Thyroid Disease Mother     Arthritis Father      REVIEW OF SYSTEMS:  Review of systems not obtained due to patient factors - mental status  PHYSICAL EXAM:    Vitals:  BP (!) 99/59   Pulse 96   Temp 96.8 °F (36 °C) (Oral)   Resp 23   Ht 5' 3\" (1.6 m) Comment: Per daughter  Wt 130 lb (59 kg)   LMP  (LMP Unknown)   SpO2 95%   BMI 23.03 kg/m²     Constitutional: obtunded  ENT: moist mucous membranes  Neck: neck supple, trachea midline  Lungs: Good inspiratory effort, no wheeze, no rhonchi, no rales  Heart: RRR, normal S1 and S2  GI: Soft, non-distended, +BS  MSK: no edema noted  Pulses: 2+ pulses bilaterally  Skin: warm, dry       DATA:  CBC:   Lab Results   Component Value Date    WBC 10.6 08/13/2021    RBC 3.87 08/13/2021    HGB 10.9 08/13/2021    HCT 33.7 08/13/2021    MCV 87.3 08/13/2021    MCH 29.9 08/13/2021    MCHC 34.2 08/13/2021    RDW 13.8 08/13/2021     08/13/2021    MPV 8.7 03/27/2014     CMP:    Lab Results   Component Value Date     08/13/2021    K 3.5 08/13/2021     08/13/2021    CO2 24 08/13/2021    BUN 13 08/13/2021    CREATININE 0.9 08/13/2021    CREATININE 0.61 08/13/2021    GFRAA >60 08/13/2021    LABGLOM 60 08/13/2021    GLUCOSE 117 08/13/2021    PROT 7.0 08/13/2021 LABALBU 4.1 08/13/2021    CALCIUM 9.4 08/13/2021    BILITOT 0.9 08/13/2021    ALKPHOS 77 08/13/2021    AST 55 08/13/2021    ALT 71 08/13/2021     ASSESSMENT AND PLAN:      # Fall with multiple vertebral fractures and subsequent bilateral embolic CVA  - fall from 2nd story  - fractures at T1, T2, T7, L1, sacral and L pubic rami fractures- non operative per trauma, ortho and neurosurgery  - became obtunded while in the ED. Repeat CT negative. MRI shows small bilateral frontal embolic infarcts  - CTA head/neck negative  - family decided on comfort care- DNR-CC. Wants neuro assessment prior to deciding on hospice  - neuro consulted  - will hold off on hospice consult at this time  - comfort measures    DVT: lovenox    Disposition: Pt sp bilateral CVA. Monitoring neuro status closely. Will not know prognosis for 48-72 hours per neuro. Comfort care. Anticipated length of stay greater than 48 hours.       Nicholas Rojas, DO  Internal Medicine

## 2021-08-13 NOTE — FLOWSHEET NOTE
Perfectserve sent to Dr. Bennett to clarify lovenox order. Electronically signed by Yessi Kerns RN on 8/13/2021 at 3:02 PM   Telephone order to discontinue lovenox received from Dr. Bennett.  Electronically signed by Yessi Kerns RN on 8/13/2021 at 3:12 PM

## 2021-08-13 NOTE — ED NOTES
Pt family states that Trauma MD spoke with them and that Dr. Jaden Mancini was called by them and that he would be to the ER to see pt at 2pm today. Family continues to state that they would like pt to be seen in ER so that they may decide which route to go with treatment or to go hospice from ER.       Juanita Woo RN  08/13/21 [id number]

## 2021-08-13 NOTE — CONSULTS
Consult Note  Patient: Rik Martinez  Unit/Bed: Y100/E750-86  YOB: 1940  MRN: [id number]  Acct: [de-identified]   Admitting Diagnosis: Acute CVA (cerebrovascular accident) [facility]) [I63.9]  Date:  2021  Hospital Day: 0    Complaint:  Sacral and left pubic rami fracture    History of Present Illness: Obtained from chart review and family. This is a 78year old female patient with past medical history of dementia lives with daughter walks independently, hearing loss, memory loss, RA, and hypothyroidism. Patient was wandering around in her home when she fell out of an opened second story window onto concrete driveway that was witnessed by the neighbor. Her daughter heard noise outside and went running to her mom who was awake, alert and complaining of pain. Imaging obtained per trauma protocol showing sacral and left pubic rami fracture and orthopedic surgery was consulted for evaluation and treatment recommendations. PMHx:  Past Medical History:   Diagnosis Date    Dependent edema     Hearing loss     wears hearing aids    History of shingles     Hypothyroidism     Memory loss 2019    Mixed dementia ([address]     RA (rheumatoid arthritis) ([address]     really is asymptomatic, not on any DMARDS       PSHx:  Past Surgical History:   Procedure Laterality Date    ABLATION OF DYSRHYTHMIC FOCUS      CARPAL TUNNEL RELEASE      R    CATARACT REMOVAL WITH IMPLANT      bilateral    COLONOSCOPY  2004    TONSILLECTOMY  194       Social Hx:  Social History     Socioeconomic History    Marital status:       Spouse name: Not on file    Number of children: Not on file    Years of education: Not on file    Highest education level: Not on file   Occupational History    Not on file   Tobacco Use    Smoking status: Former Smoker     Packs/day: 0.25     Years: 6.00     Pack years: 1.50     Types: Cigarettes     Quit date: 11/10/1973     Years since quittin.7    Smokeless tobacco: Never Used   Substance and Sexual Activity    Alcohol use: Yes     Comment: rare    Drug use: No    Sexual activity: Not on file   Other Topics Concern    Not on file   Social History Narrative    Not on file     Social Determinants of Health     Financial Resource Strain: Low Risk     Difficulty of Paying Living Expenses: Not hard at all   Food Insecurity: No Food Insecurity    Worried About Running Out of Food in the Last Year: Never true    Tequila of Food in the Last Year: Never true   Transportation Needs: No Transportation Needs    Lack of Transportation (Medical): No    Lack of Transportation (Non-Medical): No   Physical Activity:     Days of Exercise per Week:     Minutes of Exercise per Session:    Stress:     Feeling of Stress :    Social Connections:     Frequency of Communication with Friends and Family:     Frequency of Social Gatherings with Friends and Family:     Attends [employer] Services:     Active Member of Clubs or Organizations:     Attends Club or Organization Meetings:     Marital Status:    Intimate Partner Violence:     Fear of Current or Ex-Partner:     Emotionally Abused:     Physically Abused:     Sexually Abused:        Family Hx:  Family History   Problem Relation Age of Onset    Dementia Mother     Thyroid Disease Mother     Arthritis Father        Review of Systems:   Review of Systems   Unable to perform ROS: Patient unresponsive              Physical Examination:    BP (!) 86/56   Pulse 112   Temp 98.7 °F (37.1 °C) (Axillary)   Resp 24   Ht 5' 3\" (1.6 m) Comment: Per daughter  Wt 130 lb (59 kg)   LMP  (LMP Unknown)   SpO2 100%   BMI 23.03 kg/m²    Physical Exam    Patient seen laying in bed with eyes open however is unresponsive. She does not respond to painful stimuli. There is no deformity appreciated to bilateral lower extremities. She has mild edema to right ankle. There is few ecchymotic areas noted to bilateral lower extremities. LABS:  CBC:   Lab Results   Component Value Date    WBC 10.6 08/13/2021    RBC 3.87 08/13/2021    HGB 10.9 08/13/2021    HCT 33.7 08/13/2021    MCV 87.3 08/13/2021    MCH 29.9 08/13/2021    MCHC 34.2 08/13/2021    RDW 13.8 08/13/2021     08/13/2021    MPV 8.7 03/27/2014     CBC with Differential:    Lab Results   Component Value Date    WBC 10.6 08/13/2021    RBC 3.87 08/13/2021    HGB 10.9 08/13/2021    HCT 33.7 08/13/2021     08/13/2021    MCV 87.3 08/13/2021    MCH 29.9 08/13/2021    MCHC 34.2 08/13/2021    RDW 13.8 08/13/2021    LYMPHOPCT 11.4 08/13/2021    MONOPCT 6.6 08/13/2021    BASOPCT 0.2 08/13/2021    MONOSABS 0.7 08/13/2021    LYMPHSABS 1.2 08/13/2021    EOSABS 0.0 08/13/2021    BASOSABS 0.0 08/13/2021     CMP:    Lab Results   Component Value Date     08/13/2021    K 3.5 08/13/2021     08/13/2021    CO2 24 08/13/2021    BUN 13 08/13/2021    CREATININE 0.9 08/13/2021    CREATININE 0.61 08/13/2021    GFRAA >60 08/13/2021    LABGLOM 60 08/13/2021    GLUCOSE 117 08/13/2021    PROT 7.0 08/13/2021    LABALBU 4.1 08/13/2021    CALCIUM 9.4 08/13/2021    BILITOT 0.9 08/13/2021    ALKPHOS 77 08/13/2021    AST 55 08/13/2021    ALT 71 08/13/2021     BMP:    Lab Results   Component Value Date     08/13/2021    K 3.5 08/13/2021     08/13/2021    CO2 24 08/13/2021    BUN 13 08/13/2021    LABALBU 4.1 08/13/2021    CREATININE 0.9 08/13/2021    CREATININE 0.61 08/13/2021    CALCIUM 9.4 08/13/2021    GFRAA >60 08/13/2021    LABGLOM 60 08/13/2021    GLUCOSE 117 08/13/2021     Magnesium:  Lab Results   Component Value Date    MG 2.3 07/11/2013     Troponin:    Lab Results   Component Value Date    TROPONINI 0.007 07/12/2013           Assessment:    Active Hospital Problems    Diagnosis Date Noted    Acute CVA (cerebrovascular accident) ([facility] [address] [I63.9] 08/13/2021     Patient with confirmed sacral and left pubic rami fractures seen on imaging.  These fractures are not unstable and no surgical intervention is warranted. Orthopedics will sign off of case. Please call for any questions. Thank you for allow us to be a part of this patients care. Plan:  1. Non-operative treatment recommended.              Electronically signed by RAJESH Munoz CNP on 8/13/2021 at 4:27 PM

## 2021-08-13 NOTE — ED PROVIDER NOTES
[address] [facility]  eMERGENCY dEPARTMENT eNCOUnter      Pt Name: Omayra Jaquez  MRN: [id number]  [place] 1940  Date of evaluation: 8/13/2021  Provider: Gwen Arnold MD    CHIEF COMPLAINT       Chief Complaint   Patient presents with    Fall         HISTORY OF PRESENT ILLNESS   (Location/Symptom, Timing/Onset,Context/Setting, Quality, Duration, Modifying Factors, Severity)  Note limiting factors. Omayra Jaquez is a 78 y.o. female who presents to the emergency department for evaluation after a fall greater than 10 feet. Patient has history of dementia lives with her daughter and is able to ambulate on her own. She apparently was wandering this morning and fell out of a second story window that was open onto a concrete driveway. This surprisingly was witnessed by the neighbor and then the daughter had heard the noise running outside found her mom awake but complaining of right arm pain. No vomiting. No significant alteration in mental status. Because of her dementia she keeps saying she hurts but she is not specific. She seems to be moving all extremities. She was appropriately c-collar backboarded and brought in as a level 1 trauma based on mechanism and age and initial concern about confusion. Pulse ox started in the mid 80s and 2 L and improved to high 90s. HPI    NursingNotes were reviewed. REVIEW OF SYSTEMS    (2-9 systems for level 4, 10 or more for level 5)     Review of Systems   Unable to perform ROS: Dementia   Constitutional: Negative for fatigue and fever. Gastrointestinal: Negative for abdominal pain. Psychiatric/Behavioral: Negative for agitation and behavioral problems. All other systems reviewed and are negative. Except as noted above the remainder of the review of systems was reviewed and negative.        PAST MEDICAL HISTORY     Past Medical History:   Diagnosis Date    Dependent edema     Hearing loss     wears hearing aids    History of shingles     Hypothyroidism     Memory loss 2019    Mixed dementia ([facility] [facility]     RA (rheumatoid arthritis) ([facility] [address]     really is asymptomatic, not on any DMARDS         SURGICALHISTORY       Past Surgical History:   Procedure Laterality Date    ABLATION OF DYSRHYTHMIC FOCUS      CARPAL TUNNEL RELEASE      R    CATARACT REMOVAL WITH IMPLANT      bilateral    COLONOSCOPY     [address]         CURRENT MEDICATIONS       Current Discharge Medication List      CONTINUE these medications which have NOT CHANGED    Details   risperiDONE (RISPERDAL) 0.5 MG tablet Take 1 tablet by mouth every evening  Qty: 30 tablet, Refills: 2    Associated Diagnoses: Agitation      flecainide (TAMBOCOR) 50 MG tablet Take 1 tablet by mouth 2 times daily  Qty: 180 tablet, Refills: 3      Multiple Vitamin (MULTI-DAY PO) Take by mouth      aspirin 81 MG tablet Take 81 mg by mouth daily      Calcium Carbonate-Vitamin D (OYSTER SHELL CALCIUM/D) 500-200 MG-UNIT TABS Take 1 tablet by mouth             ALLERGIES     Bupivacaine hcl, Erythromycin, Histamine, Percocet [oxycodone-acetaminophen], Procaine hcl, and Vicodin [hydrocodone-acetaminophen]    FAMILY HISTORY       Family History   Problem Relation Age of Onset    Dementia Mother     Thyroid Disease Mother     Arthritis Father           SOCIAL HISTORY       Social History     Socioeconomic History    Marital status:       Spouse name: Not on file    Number of children: Not on file    Years of education: Not on file    Highest education level: Not on file   Occupational History    Not on file   Tobacco Use    Smoking status: Former Smoker     Packs/day: 0.25     Years: 6.00     Pack years: 1.50     Types: Cigarettes     Quit date: 11/10/1973     Years since quittin.7    Smokeless tobacco: Never Used   Substance and Sexual Activity    Alcohol use: Yes     Comment: rare    Drug use: No    Sexual activity: Not on file   Other Topics Concern    Not on file Social History Narrative    Not on file     Social Determinants of Health     Financial Resource Strain: Low Risk     Difficulty of Paying Living Expenses: Not hard at all   Food Insecurity: No Food Insecurity    Worried About Running Out of Food in the Last Year: Never true    [address] in the Last Year: Never true   Transportation Needs: No Transportation Needs    Lack of Transportation (Medical): No    Lack of Transportation (Non-Medical): No   Physical Activity:     Days of Exercise per Week:     Minutes of Exercise per Session:    Stress:     Feeling of Stress :    Social Connections:     Frequency of Communication with Friends and Family:     Frequency of Social Gatherings with Friends and Family:     Attends [employer] Services:     Active Member of Clubs or Organizations:     Attends Club or Organization Meetings:     Marital Status:    Intimate Partner Violence:     Fear of Current or Ex-Partner:     Emotionally Abused:     Physically Abused:     Sexually Abused:        SCREENINGS    Ricky Coma Scale  Eye Opening: To pain  Best Verbal Response: None  Best Motor Response: Withdraws from pain  Ricky Coma Scale Score: 7 @FLOW([id number])@      PHYSICAL EXAM    (up to 7 for level 4, 8 or more for level 5)     ED Triage Vitals   BP Temp Temp src Pulse Resp SpO2 Height Weight   -- -- -- -- -- -- -- --       Physical Exam  Vitals and nursing note reviewed. Constitutional:       Appearance: She is well-developed. She is not ill-appearing. HENT:      Head: Normocephalic. Nose: Nose normal.      Mouth/Throat:      Mouth: Mucous membranes are moist.   Eyes:      Conjunctiva/sclera: Conjunctivae normal.      Pupils: Pupils are equal, round, and reactive to light. Cardiovascular:      Rate and Rhythm: Normal rate and regular rhythm. Heart sounds: Normal heart sounds. Pulmonary:      Effort: Pulmonary effort is normal.      Breath sounds: Normal breath sounds.    Abdominal: General: Bowel sounds are normal.      Palpations: Abdomen is soft. Tenderness: There is no abdominal tenderness. There is no guarding. Musculoskeletal:         General: Normal range of motion. Arms:       Cervical back: Normal range of motion and neck supple. Comments: On initial trauma exam patient is moving all extremities with no obvious deformity. Pelvis is stable. Back is nontender. Skin:     General: Skin is warm and dry. Capillary Refill: Capillary refill takes less than 2 seconds. Neurological:      General: No focal deficit present. Mental Status: She is alert. Mental status is at baseline. GCS: GCS eye subscore is 4. GCS verbal subscore is 5. GCS motor subscore is 6. Psychiatric:         Mood and Affect: Mood normal.         DIAGNOSTIC RESULTS     EKG: All EKG's are interpreted by the Emergency Department Physician who either signs or Co-signsthis chart in the absence of a cardiologist.        RADIOLOGY:   Courtney Hoots such as CT, Ultrasound and MRI are read by the radiologist. Plain radiographic images are visualized and preliminarily interpreted by the emergency physician with the below findings:      Interpretation per the Radiologist below, if available at the time ofthis note:    MRI BRAIN WO CONTRAST   Final Result    There are several small millimeter or less, foci of acute ischemia in the cortex of both the right and left frontal lobes. The findings may represent embolic phenomenon versus a hypotensive episode. There are chronic involutional changes with moderate atrophy usually associated with chronic microangiopathy. There is a 5 cm subcutaneous fluid collection in the posterior scalp consistent with a hematoma. The findings were called to ER at 1132 hours and Grace Jefferson took a message. CT Head WO Contrast   Final Result      NO ACUTE INTRACRANIAL PROCESS OR SIGNIFICANT CHANGE FROM EARLIER 8/13/2021 IDENTIFIED.                   CT HEAD WO CONTRAST   Final Result      NO ACUTE INTRACRANIAL PROCESS OR SIGNIFICANT CHANGE FROM 5/10/2019 IDENTIFIED. CT CERVICAL SPINE WO CONTRAST   Final Result      MILDLY DISPLACED FRACTURE OF THE SUPERIOR ENDPLATE OF C7. MILD COMPRESSION FRACTURE T2 WITH NONDISPLACED HORIZONTAL EXTENSION THROUGH THE LAMINA AND BASE OF THE SPINOUS PROCESS. SMALL AVULSION FRACTURE OF THE POSTERIOR INFERIOR T1 SPINOUS PROCESS. MILD CERVICAL SPONDYLOSIS. CT THORACIC SPINE WO CONTRAST   Final Result   FINAL IMPRESSION:       ACUTE T1, T2, T7, L1, SACRAL AND LEFT PUBIC RAMI FRACTURES, AS DESCRIBED. PROBABLY CHRONIC HEALED MILD T3 COMPRESSION FRACTURE. SMALL SUBCUTANEOUS FAT LEFT BUTTOCK HEMATOMA/ECCHYMOSIS      NO EVIDENCE OF SOLID ORGAN INJURY OR OTHER POSTTRAUMATIC COMPLICATION IDENTIFIED. CT LUMBAR SPINE WO CONTRAST   Final Result   FINAL IMPRESSION:       ACUTE T1, T2, T7, L1, SACRAL AND LEFT PUBIC RAMI FRACTURES, AS DESCRIBED. PROBABLY CHRONIC HEALED MILD T3 COMPRESSION FRACTURE. SMALL SUBCUTANEOUS FAT LEFT BUTTOCK HEMATOMA/ECCHYMOSIS      NO EVIDENCE OF SOLID ORGAN INJURY OR OTHER POSTTRAUMATIC COMPLICATION IDENTIFIED. CT CHEST W CONTRAST   Final Result   FINAL IMPRESSION:       ACUTE T1, T2, T7, L1, SACRAL AND LEFT PUBIC RAMI FRACTURES, AS DESCRIBED. PROBABLY CHRONIC HEALED MILD T3 COMPRESSION FRACTURE. SMALL SUBCUTANEOUS FAT LEFT BUTTOCK HEMATOMA/ECCHYMOSIS      NO EVIDENCE OF SOLID ORGAN INJURY OR OTHER POSTTRAUMATIC COMPLICATION IDENTIFIED. CT ABDOMEN PELVIS W IV CONTRAST Additional Contrast? None   Final Result   FINAL IMPRESSION:       ACUTE T1, T2, T7, L1, SACRAL AND LEFT PUBIC RAMI FRACTURES, AS DESCRIBED. PROBABLY CHRONIC HEALED MILD T3 COMPRESSION FRACTURE. SMALL SUBCUTANEOUS FAT LEFT BUTTOCK HEMATOMA/ECCHYMOSIS      NO EVIDENCE OF SOLID ORGAN INJURY OR OTHER POSTTRAUMATIC COMPLICATION IDENTIFIED. CTA NECK W WO CONTRAST   Final Result   FINAL IMPRESSION:       NO FLOW-LIMITING STENOSIS, DISSECTION OR EVIDENCE OF BCVI IDENTIFIED. XR CHEST PORTABLE   Final Result   NO ACUTE CARDIOPULMONARY DISEASE. XR PELVIS (1-2 VIEWS)   Final Result   There are nondisplaced fractures of proximal left superior and inferior pubic rami. XR ELBOW RIGHT (MIN 3 VIEWS)    (Results Pending)         ED BEDSIDE ULTRASOUND:   Performed by ED Physician - none    LABS:  Labs Reviewed   COMPREHENSIVE METABOLIC PANEL - Abnormal; Notable for the following components:       Result Value    Glucose 117 (*)     Total Bilirubin 0.9 (*)     ALT 71 (*)     AST 55 (*)     All other components within normal limits   CBC WITH AUTO DIFFERENTIAL - Abnormal; Notable for the following components:    RBC 3.87 (*)     Hemoglobin 11.6 (*)     Hematocrit 33.7 (*)     Neutrophils Absolute 8.7 (*)     All other components within normal limits   POCT ARTERIAL - Abnormal; Notable for the following components:    POC Potassium 3.1 (*)     POC Glucose 140 (*)     GFR Non-African American 60 (*)     pCO2, Arterial 32 (*)     pO2, Arterial 79 (*)     O2 Sat, Arterial 96 (*)     POC Hematocrit 32 (*)     Hemoglobin 10.9 (*)     All other components within normal limits   COVID-19, RAPID   PROTIME-INR   ETHANOL   URINE RT REFLEX TO CULTURE       All other labs were within normal range or not returned as of this dictation. EMERGENCY DEPARTMENT COURSE and DIFFERENTIAL DIAGNOSIS/MDM:   Vitals:    Vitals:    08/13/21 1501 08/13/21 1601 08/13/21 1701 08/13/21 1801   BP: (!) 74/25 (!) 86/56 (!) 86/58 87/60   Pulse: 121 112 101 110   Resp:       Temp:       TempSrc:       SpO2: 98% 100% 99% 98%   Weight:       Height:              MDM based on mechanism patient will have full body scan. First look portable chest x-ray did not show signs of pneumothorax but there are concerns for rib fracture. Pt signed out to me by Dr. Casa Hammond. Pt is being evaluated for fall out of 2 story window. Pt per Dr. Farrukh Haque was speaking prior to going to CT scan. Labs remarkable for Hb 11.6, AST 71, AST 55.  CT head negative. CT cspine shows fx of C7, compression fx of T2, small avulsion fracture of T1.  CT T and L spine shows acute T1, T2, T7, L1, sacral and L pubic rami fxs. CT CAP negative for injury. Upon return from CT scan, pt then with sonorous respirations and unresponsive. I immediately came to the bedside and pt snoring with GCS 3.  I spoke with son and family (son is the POA), family wants pt to be a DNR. No intubation, no CPR. Unsure of etiology of acute mental status change and pt being unresponsive. Repeat CT head done to r/o delayed bleed. CT head negative. Case discussed with Dr. Laura Osullivan (neurology) who recommended a stat MRI. A STAT MRI revealed several small foci of acute ischemia in L and R frontal lobes. May represent embolic phenomenon versus hypotensive episode. Pt reassessed and still with GCS 3. Family unsure about hospice cause they are interested in a prognosis. Plan is to admit pt to medicine to see if pt improves in the next 48-72 hours. Case discussed with Dr. Yelena Antonio (trauma) who agrees with plan. Case then discussed with Dr. VYAS [facility] [employer] (medicine) and pt admitted to medicine in critical condition. Critical care time:  44 min excluding procedures    CONSULTS:  IP CONSULT TO NEUROSURGERY  IP CONSULT TO NEUROLOGY  IP CONSULT TO ORTHOPEDIC SURGERY  IP CONSULT TO NEUROLOGY  IP CONSULT TO NEUROLOGY  IP CONSULT TO SPIRITUAL SERVICES  IP CONSULT TO HOSPICE    PROCEDURES:  Unless otherwise noted below, none     Procedures    FINAL IMPRESSION      1. Cerebrovascular accident (CVA), unspecified mechanism ([address]    2. Fall, initial encounter    3.  Closed displaced fracture of seventh cervical vertebra, unspecified fracture morphology, initial encounter ([address]    4. Closed wedge compression fracture of T1 vertebra, initial encounter ([facility] [address]    5. Closed wedge compression fracture of T2 vertebra, initial encounter ([facility] [address]    6. Closed wedge compression fracture of T7 vertebra, initial encounter ([facility] [address]    7. Closed wedge compression fracture of L1 vertebra, initial encounter ([facility] [address]    8. Closed nondisplaced fracture of left ilium, unspecified fracture morphology, initial encounter [facility])          DISPOSITION/PLAN   DISPOSITION Admitted 08/13/2021 12:03:53 PM      PATIENT REFERRED TO:  No follow-up provider specified.     DISCHARGE MEDICATIONS:  Current Discharge Medication List             (Please note that portions of this note were completed with a voice recognition program.  Efforts were made to edit the dictations but occasionally words are mis-transcribed.)    Tara Weller MD (electronically signed)  Attending Emergency Physician          Tara Weller MD  08/13/21 1911

## 2021-08-13 NOTE — FLOWSHEET NOTE
Hector VALADEZW for hospice placement.  Electronically signed by Ana Yanes RN on 8/13/2021 at 4:37 PM

## 2021-08-13 NOTE — ED NOTES
Trauma Dr, respiratory, lab and nurses at cart side on arrival. Per EMS found on driveway. No obvious trauma noted on arrival. Trauma surgeon states no step-off's , no chest crepitus, abdomen soft non-distended. C/O some abdominal discomfort.      Lorenzo Perez RN  08/13/21 6661

## 2021-08-13 NOTE — DISCHARGE INSTR - COC
Continuity of Care Form    Patient Name: Rajni Madrigal   :  1940  MRN:  [id number]    Admit date:  2021  Discharge date:  ***    Code Status Order: DNR-CC   Advance Directives:      Admitting Physician:  Erin Atkinson DO  PCP: Deandre Fox MD    Discharging Nurse: [facility] Unit/Room#: R623/D273-20  Discharging Unit Phone Number: ***    Emergency Contact:   Extended Emergency Contact Information  Primary Emergency Contact: [address] Phone: [phone]  Work Phone: [phone]  Mobile Phone: [phone]  Relation: Child  Secondary Emergency Contact: [address]   [address] Phone: [phone]  Work Phone: [phone]  Mobile Phone: [phone]  Relation: Child    Past Surgical History:  Past Surgical History:   Procedure Laterality Date    ABLATION OF DYSRHYTHMIC FOCUS      CARPAL TUNNEL RELEASE      R    CATARACT REMOVAL WITH IMPLANT      bilateral    COLONOSCOPY      TONSILLECTOMY         Immunization History:   Immunization History   Administered Date(s) Administered    COVID-19, Moderna, PF, 100mcg/0.5mL 2021, 2021    Influenza Vaccine, unspecified formulation 2014    Influenza Virus Vaccine 2014    Influenza Whole 10/01/2011    Influenza, High Dose (Fluzone 65 yrs and older) 10/18/2016, 2018, 2019    Pneumococcal Conjugate 13-valent (Nancy Hummer) 2015, 2018    Pneumococcal Polysaccharide ([id number]) 10/01/2010       Active Problems:  Patient Active Problem List   Diagnosis Code    Hypothyroidism E03.9    RA (rheumatoid arthritis) ([facility] [address] M06.9    History of shingles Z86.19    Hearing loss H91.90    Dependent edema R60.9    Post-nasal drip R09.82    Weight loss R63.4    Depression F32.9    Poor appetite R63.0    A-fib (HCC) I48.91    Osteoporosis M81.0    Memory loss R41.3    Senile osteoporosis M81.0    Mixed dementia (HCC) G30.9, F01.50, F02.80    Acute CVA (cerebrovascular accident) [facility]) I63.9       Isolation/Infection:   Isolation            No Isolation          Patient Infection Status       None to display            Nurse Assessment:  Last Vital Signs: BP 87/60   Pulse 110   Temp 98.7 °F (37.1 °C) (Axillary)   Resp 24   Ht 5' 3\" (1.6 m) Comment: Per daughter  Wt 130 lb (59 kg)   LMP  (LMP Unknown)   SpO2 98%   BMI 23.03 kg/m²     Last documented pain score (0-10 scale): Pain Level: 8  Last Weight:   Wt Readings from Last 1 Encounters:   08/13/21 130 lb (59 kg)     Mental Status:  {IP PT MENTAL STATUS:20030:::0}    IV Access:  { RIN IV ACCESS:145116391:::0}    Nursing Mobility/ADLs:  Walking   {CHP DME ADLs:957517493:::0}  Transfer  {CHP DME ADLs:289762874:::0}  Bathing  {CHP DME ADLs:921529107:::0}  Dressing  {CHP DME ADLs:029720551:::0}  Toileting  {CHP DME ADLs:267340850:::0}  Feeding  {CHP DME ADLs:008615539:::0}  Med Admin  {CHP DME ADLs:225211719:::0}  Med Delivery   { RIN MED Delivery:648471515:::0}    Wound Care Documentation and Therapy:        Elimination:  Continence: Bowel: {YES / KB:04979}  Bladder: {YES / IS:22287}  Urinary Catheter: {Urinary Catheter:655117462:::0}   Colostomy/Ileostomy/Ileal Conduit: {YES / UA:41159}       Date of Last BM: ***  No intake or output data in the 24 hours ending 08/13/21 1936  No intake/output data recorded.     Safety Concerns:     [employer] Safety Concerns:443433172:::0}    Impairments/Disabilities:      [employer] Impairments/Disabilities:980830906:::0}    Nutrition Therapy:  Current Nutrition Therapy:   [employer] Diet List:495958868:::0}    Routes of Feeding: {CHP DME Other Feedings:802939031:::0}  Liquids: {Slp liquid thickness:89232}  Daily Fluid Restriction: {CHP DME Yes amt example:946953351:::0}  Last Modified Barium Swallow with Video (Video Swallowing Test): {Done Not Done RDXO:870243580:::8}    Treatments at the Time of Hospital Discharge:   Respiratory Treatments: ***  Oxygen Therapy:  {Therapy; copd oxygen:47883:::0}  Ventilator:    {[facility] Vent List:572945776:::0}    Rehab Therapies: {THERAPEUTIC INTERVENTION:1747874055}  Weight Bearing Status/Restrictions: {[facility] Weight Bearin:::0}  Other Medical Equipment (for information only, NOT a DME order):  {EQUIPMENT:633650255}  Other Treatments: ***    Patient's personal belongings (please select all that are sent with patient):  {CHP DME Belongings:664883912:::0}    RN SIGNATURE:  {Esignature:790934894:::0}    CASE MANAGEMENT/SOCIAL WORK SECTION    Inpatient Status Date: ***    Readmission Risk Assessment Score:  Readmission Risk              Risk of Unplanned Readmission:  10           Discharging to Facility/ Agency   Name:   Address:  Phone:  Fax:    Dialysis Facility (if applicable)   Name:  Address:  Dialysis Schedule:  Phone:  Fax:    / signature: {Esignature:494827395:::0}    PHYSICIAN SECTION    Prognosis: Poor    Condition at Discharge: Terminal    Rehab Potential (if transferring to Rehab): Poor    Recommended Labs or Other Treatments After Discharge:     Physician Certification: I certify the above information and transfer of Sanjiv Russo  is necessary for the continuing treatment of the diagnosis listed and that she requires Hospice for less 30 days.      Update Admission H&P: No change in H&P    PHYSICIAN SIGNATURE:  Electronically signed by Tu Montoya DO on 21 at 7:37 PM EDT

## 2021-08-13 NOTE — FLOWSHEET NOTE
Anand sent to Dr. Robby Landaverde for pain medication.  Electronically signed by Socorro Nichole RN on 8/13/2021 at 6:38 PM

## 2021-08-13 NOTE — PROGRESS NOTES
TRAUMA PROGRESS NOTE      Patient Name: Sanjiv Russo  Admission Date 2021    Hospital Day: 0  Patient seen and examined on 2021    INTERVAL HISTORY/EVENTS       Sanjiv Russo is a 78 y.o. female with a PMHx of Hypothyroidism, RA, dementia (indepently living PTA) who presented initally to [facility] ED as a CAT 1 trauma s/p fall from a second story window. (+) head strike, (+) ASA 81mg daily. On initial arrival to ED at 6am patient noted to be ambulatory, approprietly conversant with GCS 15. Initial trauma work up found pubic rami fractures, sacral fracture, C7 endplate fracture, T1 spinous process fracture and T2 endplate fracture with extension into the lamina and spinous process. NSGY (Dr. Isaac Solomon) consulted with rec's for Pinson collar and non-operative management. Ortho consulted for pelvic and sacral fractures. At approximately 7:00/7:15 she was noted to have a change in mental status and respirations were Kussmaul with GCS of 2/1/3. Code Status updated with son (POA) to DNR- CC. She was revaluated by our service and repeat CT head was obtained which did not show any acute intracranial bleeding. Neurology subsequently consulted and STAT MRI was obtained. MRI demonstrated several areas of acute ischemia of both the right and left lobes. Suspect that ischemic event preceded traumatic fall. She was admitted to the Medicine service for possible transfer to Hospice care pending formal Neurology evaluation and prognostic discussion with family. Trauma remains following peripherally as consult. TERTIARY - PHYSICAL EXAM      Vitals Average, Min and Max for last 24 hours:  Temp: Temp: 98.7 °F (37.1 °C);  Temp  Av.4 °F (36.3 °C)  Min: 96.8 °F (36 °C)  Max: 98.7 °F (37.1 °C)  Respirations: Resp  Av.5  Min: 21  Max: 39  Pulse: Pulse  Av.2  Min: 81  Max: 108  Blood Pressure: Systolic (68RJE), EWP:415 , Min:94 , PPS:637   ; Diastolic (44OGI), BDZ:27, Min:49, Max:79  SpO2: SpO2  Av %  Min: 92 %  Max: 98 %    24hr Intake/Output: No intake or output data in the 24 hours ending 08/13/21 1510    Vitals: BP (!) 94/53   Pulse 108   Temp 98.7 °F (37.1 °C) (Axillary)   Resp 24   Ht 5' 3\" (1.6 m) Comment: Per daughter  Wt 130 lb (59 kg)   LMP  (LMP Unknown)   SpO2 95%   BMI 23.03 kg/m²     Physical Exam:  Constitutional: Unresponsive. GCS 4. Kussmaul respirations. HEENT: Atraumatic normocephalic. Blood in oropharynx. Laceration noted on upper and lower tongue. Upper dentures removed. Cardiovascular: Regular rate and rhythm. Pulmonary: Clear to ausculation bilaterally. Kussmaul respirations on 2L NC. Abdominal: Soft. Non-distended. No guarding. Neurological: GCS 2/1/3. Up-going Babinski.       LABORATORY RESULTS (LAST 24 HOURS)     CBC with Differential:    Lab Results   Component Value Date    WBC 10.6 08/13/2021    RBC 3.87 08/13/2021    HGB 10.9 08/13/2021    HCT 33.7 08/13/2021     08/13/2021    MCV 87.3 08/13/2021    MCH 29.9 08/13/2021    MCHC 34.2 08/13/2021    RDW 13.8 08/13/2021    LYMPHOPCT 11.4 08/13/2021    MONOPCT 6.6 08/13/2021    BASOPCT 0.2 08/13/2021    MONOSABS 0.7 08/13/2021    LYMPHSABS 1.2 08/13/2021    EOSABS 0.0 08/13/2021    BASOSABS 0.0 08/13/2021     CMP:    Lab Results   Component Value Date     08/13/2021    K 3.5 08/13/2021     08/13/2021    CO2 24 08/13/2021    BUN 13 08/13/2021    CREATININE 0.9 08/13/2021    CREATININE 0.61 08/13/2021    GFRAA >60 08/13/2021    LABGLOM 60 08/13/2021    GLUCOSE 117 08/13/2021    PROT 7.0 08/13/2021    LABALBU 4.1 08/13/2021    CALCIUM 9.4 08/13/2021    BILITOT 0.9 08/13/2021    ALKPHOS 77 08/13/2021    AST 55 08/13/2021    ALT 71 08/13/2021     Magnesium:    Lab Results   Component Value Date    MG 2.3 07/11/2013     PT/INR:    Lab Results   Component Value Date    PROTIME 14.8 08/13/2021    INR 1.2 08/13/2021     PTT:    Lab Results   Component Value Date    APTT 32.4 07/13/2013       IMAGING RESULTS (PERSONALLY REVIEWED and per Radiology Report)     All admission and follow up imaging reviewed. ASSESSMENT & PLAN     Diagnoses:  1. Bilateral frontal lobe ischemic CVA  2. Mildly displaced fracture of the superior endplate of C7  3. Mild compression fracture T2 with nondisplaced horizontal extension through the lamina and base of the spinous process  4. Small avulsion fracture of the posterior inferior T1 spinous process  5. Minimally displaced fracture of the superior endplate of L1  6. Left pubic rami fracture  7. Mildly displaced fractures of the mid to distal sacrum  8. Tongue laceration      PMHx: Hypothyroidism, RA, dementia (indepently living PTA)    Incidental Findings: None      ASSESSMENT/PLAN:  Suspect that ischemic event preceded traumatic fall. She was admitted to the Medicine service for possible transfer to Hospice care pending formal Neurology evaluation and prognostic discussion with family. Trauma remains following peripherally as consult. Bilateral frontal lobe ischemic CVA  - Suspect that ischemic event preceded traumatic fall  - GCS 4 on exam  - Prognosis guarded  - Neurology (Dr. Lorraine Esqueda) consulted and awaiting formal evaluation and prognostic discussion with family. C7, T1, T2 and L1 fractures with morphology as listed in above diagnosis  - Spoke with NSGY (Dr. Edith Jernigan) personally who recommends non-operative management and ASPEN collar  - Maintain C-spine precautions at all times  - T/L spines cleared    Left pubic rami fracture and mildly displaced sacral fractures  - Orthopaedic surgery consulted and formal evaluation and recs pending  - NWB until seen by Ortho    Tongue Laceration  - Evaluated in ED. Appears to be from biting down. Dentures removed and laceration noted underneath and on top of tongue. Will not repair at this time as she is maintaining her airway and believe repairing laceration will be more traumatic for patient/fmily at this time.        Trauma will continue to follow peripherally. Please call for additional questions or concerns. Please call for any questions or concerns.     Valentin Saenz B. Everfletcher Bebeto [facility].  [phone] ([phone]) [phone]     This patient's plan of care was discussed and made in collaboration with Trauma Attending physician, Ceci Frost MD.

## 2021-08-13 NOTE — ED NOTES
Bed: 15  Expected date:   Expected time:   Means of arrival:   Comments:  [address] [facility], [address] [facility]  08/13/21 8396

## 2021-08-13 NOTE — ED NOTES
Bed: 01  Expected date: 8/13/21  Expected time:   Means of arrival:   Comments:  [address]  112/64  87  18  [address] [facility]  08/13/21 4398

## 2021-08-13 NOTE — ED NOTES
Pt breathing changes at this time. Pt less responsive than family stated she was previously. Pt staring with glare. Snoring breathing at this time and Dr. Harsh Harris notified of the change.       Fidel Milligna RN  08/13/21 2529

## 2021-08-13 NOTE — ED NOTES
Pt lying in bed and remains responsive only to pain. Pt eyes are open.  Family at bedside     [facility]  08/13/21 6949

## 2021-08-13 NOTE — FLOWSHEET NOTE
Patient O2 sat 87% on 5LNC. Suctioning blood from oral airway. Unable to handle secretions. Blood suctioned. Laceration to tongue noted with bruising. Only response is withdrawal from painful stimuli. Call out to Dr[facility]. Spoke with Alma WALKER. Request to place patient on non-rebreather. Will come see patient. Family at bedside. C-collar in place. Will continue to monitor.  Electronically signed by Edward Toledo RN on 8/13/2021 at 2:15 PM

## 2021-08-13 NOTE — PROGRESS NOTES
Pt revaluated after change in MS and respiratory status. GCS 2/1/3 on reexamination. Up going Babinski bilaterally. Concern for acute intracranial event. Will obtain stat CT Head to assess. Family at bedside (Son, [place], and daughter).  Code status reviewed and updated - DNR CC.

## 2021-08-13 NOTE — CARE COORDINATION
LSW spoke with the family regarding the pt's condition and the plan for DC. Family would like [facility]. LSW called [facility] and spoke with Millicent Dumont. They will work on a meeting time with the family.

## 2021-08-13 NOTE — CONSULTS
Hx:  Social History     Socioeconomic History    Marital status:      Spouse name: Not on file    Number of children: Not on file    Years of education: Not on file    Highest education level: Not on file   Occupational History    Not on file   Tobacco Use    Smoking status: Former Smoker     Packs/day: 0.25     Years: 6.00     Pack years: 1.50     Types: Cigarettes     Quit date: 11/10/1973     Years since quittin.7    Smokeless tobacco: Never Used   Substance and Sexual Activity    Alcohol use: Yes     Comment: rare    Drug use: No    Sexual activity: Not on file   Other Topics Concern    Not on file   Social History Narrative    Not on file     Social Determinants of Health     Financial Resource Strain: Low Risk     Difficulty of Paying Living Expenses: Not hard at all   Food Insecurity: No Food Insecurity    Worried About [address] in the Last Year: Never true    [facility] in the Last Year: Never true   Transportation Needs: No Transportation Needs    Lack of Transportation (Medical): No    Lack of Transportation (Non-Medical):  No   Physical Activity:     Days of Exercise per Week:     Minutes of Exercise per Session:    Stress:     Feeling of Stress :    Social Connections:     Frequency of Communication with Friends and Family:     Frequency of Social Gatherings with Friends and Family:     Attends Jewish Services:     Active Member of Clubs or Organizations:     Attends Club or Organization Meetings:     Marital Status:    Intimate Partner Violence:     Fear of Current or Ex-Partner:     Emotionally Abused:     Physically Abused:     Sexually Abused:        Family Hx:  Family History   Problem Relation Age of Onset    Dementia Mother     Thyroid Disease Mother     Arthritis Father        Review of Systems:   Review of Systems   Unable to perform ROS: Other   Dementia and unresponsive     Physical Examination:    BP (!) 74/25   Pulse 121   Temp 98.7 °F (37.1 °C) (Axillary)   Resp 24   Ht 5' 3\" (1.6 m) Comment: Per daughter  Wt 130 lb (59 kg)   LMP  (LMP Unknown)   SpO2 98%   BMI 23.03 kg/m²      Patient seen with eyes open in bed during consultation. She is not able to follow any commands and does not respond to any painful stimuli. There is no seizures or seizure like activity appreciated. Cervical collar in place. Her pupils are pinpoint 1-2 mm and very sluggish to non-reactive. The is ecchymosis noted to her chin with no laceration or skin abrasions. She is negative for mcdowell sign, raccoon eyes and does not have any facial, or scalp lacerations or deformities. There is no blood noted inside of her ears and typmapnic membranes are visualized. There is dark red blood inside of her mouth requiring suctioning. Unable to assess for any injury/lacerations inside of the mouth that could be causing the bleeding. Trachea is midline with no deviation. She has slowed shallow respirations. Vital signs on monitor show patient to be hypotensive and tachycardic. Abdomen is soft to palpation. Unable to assess for any tenderness as patient does not respond to pain. Norris catheter in place noticed to have hematuria. Lower extremities have few ecchymotic areas. There is no deformity noted to upper or lower extremities. There is edema to right lower extremity mainly at the right ankle. Skin is cool to touch and very faint pedal pulse palpated. Patient with positive babinski sign bilaterally.              LABS:  CBC:   Lab Results   Component Value Date    WBC 10.6 08/13/2021    RBC 3.87 08/13/2021    HGB 10.9 08/13/2021    HCT 33.7 08/13/2021    MCV 87.3 08/13/2021    MCH 29.9 08/13/2021    MCHC 34.2 08/13/2021    RDW 13.8 08/13/2021     08/13/2021    MPV 8.7 03/27/2014     CBC with Differential:    Lab Results   Component Value Date    WBC 10.6 08/13/2021    RBC 3.87 08/13/2021    HGB 10.9 08/13/2021    HCT 33.7 08/13/2021  08/13/2021    MCV 87.3 08/13/2021    MCH 29.9 08/13/2021    MCHC 34.2 08/13/2021    RDW 13.8 08/13/2021    LYMPHOPCT 11.4 08/13/2021    MONOPCT 6.6 08/13/2021    BASOPCT 0.2 08/13/2021    MONOSABS 0.7 08/13/2021    LYMPHSABS 1.2 08/13/2021    EOSABS 0.0 08/13/2021    BASOSABS 0.0 08/13/2021     CMP:    Lab Results   Component Value Date     08/13/2021    K 3.5 08/13/2021     08/13/2021    CO2 24 08/13/2021    BUN 13 08/13/2021    CREATININE 0.9 08/13/2021    CREATININE 0.61 08/13/2021    GFRAA >60 08/13/2021    LABGLOM 60 08/13/2021    GLUCOSE 117 08/13/2021    PROT 7.0 08/13/2021    LABALBU 4.1 08/13/2021    CALCIUM 9.4 08/13/2021    BILITOT 0.9 08/13/2021    ALKPHOS 77 08/13/2021    AST 55 08/13/2021    ALT 71 08/13/2021     BMP:    Lab Results   Component Value Date     08/13/2021    K 3.5 08/13/2021     08/13/2021    CO2 24 08/13/2021    BUN 13 08/13/2021    LABALBU 4.1 08/13/2021    CREATININE 0.9 08/13/2021    CREATININE 0.61 08/13/2021    CALCIUM 9.4 08/13/2021    GFRAA >60 08/13/2021    LABGLOM 60 08/13/2021    GLUCOSE 117 08/13/2021     Magnesium:  Lab Results   Component Value Date    MG 2.3 07/11/2013     Troponin:    Lab Results   Component Value Date    TROPONINI 0.007 07/12/2013           Assessment:    Active Hospital Problems    Diagnosis Date Noted    Acute CVA (cerebrovascular accident) ([facility] [address] [I63.9] 08/13/2021     Initial consultation from the emergency room was for C7, T1-2 cervical fractures. Aware of all other cervical, lumbar and thoracic fractures. Imaging reviewed showing stable fractures recommend c collar at all times. Report from ER was patient was alert complaining of pain and moving all extremities. During her period in the emergency room patient went unresponsive. Trauma team obtained a second ct scan of the brain to rule out intracranial hemorrhage. These images were also reviewed showing no bleed.      This patient is in very critical condition with risk of further deterioration and possible death. Given the patients status and family request the cervical collar has been removed to make the patient more comfortable. Patient does not require neurosurgical intervention. Recommend to further keep the patient comfortable and await further recommendations from neurology who is also on the case. Thank you for allowing us to be a part of this patients care. Please call for any questions. Neurosurgery will sign off of case. Plan:  1. Recommend to keep patient comfortable   2.  Hospice consult obtained per family request             Electronically signed by RAJESH Trevino CNP on 8/13/2021 at 4:05 PM

## 2021-08-13 NOTE — FLOWSHEET NOTE
Spoke with supervisor Savage Dumont regarding patient status and visitation. Patient's family states there are about 21 family members present. Supervisor will allow 4 family members present for patient at a time, until time of death nears then we will evaluate a manageable number to allow up to patient. Admission assessment completed with family. Ye Espino in room at this time. Dr. Robby Landaverde notified of low BP 74/25. No new orders at this time. Will continue with plan of care.  Electronically signed by Natali Marie RN on 8/13/2021 at 3:40 PM

## 2021-08-13 NOTE — ED NOTES
Dr. Jaguar Belle at bedside talking with pt at this time regarding pt.       Jvaon Martines RN  08/13/21 0169

## 2021-08-13 NOTE — H&P
Trauma Consult / H & P Note    Reason for Consult: Trauma  Consulting Provider: Chelita Wilde MD    Time of arrival (trauma surgeon): 0209    BASIC INJURY INFORMATION:  Level of activation: CAT 1  Mode of transport: EMS  Mechanism of injury: Fall from height  Complicating features: >2 stories  Protective measures: NA    HISTORY OF PRESENT INJURY:   Sonu Farrell is a 78 y.o. female with a PMHx of dementia s/p fall from 2nd story onto driveway found immediately by daughter. She complains of abdominal pain and back pain. Her initial saturations 81% placed on 2 L NC and improved to 96% remained hemodynamically stable. PRIMARY SURVEY:  Airway: Intact  Breathing: Shallow   Breath Sounds: Breath Sounds Equal Bilaterally  Circulation:    Pulses: Normal   Skin: Normal skin color, texture and turgor and Warm  Disability:   Pupils: PERRL   GCS:    Best Eyes: 4    Best Verbal: 4    Best Motor: 6    Total: 14    Vitals:   Vitals:    08/13/21 0622 08/13/21 0643 08/13/21 0647   BP: (!) 118/59  (!) 118/59   Pulse: 93  93   Resp: 22 22   Temp: 96.8 °F (36 °C)  96.8 °F (36 °C)   TempSrc: Oral  Oral   SpO2: 95%  95%   Weight:  130 lb (59 kg)    Height:  5' 3\" (1.6 m)          SECONDARY SURVEY:  Neurologic: Moves all Extremities; slightly confused  HEENT:   Head: No lacerations, bony step-offs, or abrasions   Eyes: PERRL, Corneas/Conjunctiva without lesions   Ears: No Hemotympanum   Nose: Septum Midline, No crepitus with motion; Throat: Oral cavity without trauma   Neck: No midline tenderness  Pulmonary: External exam: no crepitus or pain with palpation, no contusions or abrasions;  Cardiovascular:    Pulses: Bilateral radial, femoral, DP and PT pulses are normal;  Abdomen: Appearance: Non-distended, No scars, lacerations, contusions; Rectal: Not performed  Pelvis/Perineum: Normal appearing genitalia  Musculoskeletal:    Back/Spine: Thoracolumbar spinal column tender to palpation;    Extremities: No gross upper or activity: Not on file   Other Topics Concern    Not on file   Social History Narrative    Not on file     Social Determinants of Health     Financial Resource Strain: Low Risk     Difficulty of Paying Living Expenses: Not hard at all   Food Insecurity: No Food Insecurity    Worried About Running Out of Food in the Last Year: Never true    [address] in the Last Year: Never true   Transportation Needs: No Transportation Needs    Lack of Transportation (Medical): No    Lack of Transportation (Non-Medical): No   Physical Activity:     Days of Exercise per Week:     Minutes of Exercise per Session:    Stress:     Feeling of Stress :    Social Connections:     Frequency of Communication with Friends and Family:     Frequency of Social Gatherings with Friends and Family:     Attends Mosque Services:     Active Member of Clubs or Organizations:     Attends Club or Organization Meetings:     Marital Status:    Intimate Partner Violence:     Fear of Current or Ex-Partner:     Emotionally Abused:     Physically Abused:     Sexually Abused:        FAMILY HISTORY:  Family History   Problem Relation Age of Onset    Dementia Mother     Thyroid Disease Mother     Arthritis Father            REVIEW OF SYSTEMS:   Constitutional: Negative for weight loss  HENT: Negative for congestion, facial swelling and bloody nose  Eyes: Negative for vision changes  Respiratory: Negative for shortness of breath, difficulty breathing  Cardiovascular: Negative for chest wall pain. Gastrointestinal: Negative for abdominal distention, abdominal pain and vomiting. Genitourinary: Negative for hematuria  Musculoskeletal: Negative for gait difficulties   Skin: Negative for bruising, abrasions  Neurological: Negative for dizziness, weakness and light-headedness. Hematological: Negative for easy bruising/bleeding  Psychiatric/Behavioral: Negative for behavioral problems.        Except as noted above the remainder of the review of systems was reviewed and negative. BASIC LABS:   CBC with Differential:    Lab Results   Component Value Date    WBC 10.6 08/13/2021    RBC 3.87 08/13/2021    HGB 11.6 08/13/2021    HCT 33.7 08/13/2021     08/13/2021    MCV 87.3 08/13/2021    MCH 29.9 08/13/2021    MCHC 34.2 08/13/2021    RDW 13.8 08/13/2021    LYMPHOPCT 11.4 08/13/2021    MONOPCT 6.6 08/13/2021    BASOPCT 0.2 08/13/2021    MONOSABS 0.7 08/13/2021    LYMPHSABS 1.2 08/13/2021    EOSABS 0.0 08/13/2021    BASOSABS 0.0 08/13/2021     CMP:   Lab Results   Component Value Date     08/13/2021    K 3.5 08/13/2021     08/13/2021    CO2 24 08/13/2021    BUN 13 08/13/2021    CREATININE 0.61 08/13/2021    GLUCOSE 117 (H) 08/13/2021    CALCIUM 9.4 08/13/2021    PROT 7.0 08/13/2021    LABALBU 4.1 08/13/2021    BILITOT 0.9 (H) 08/13/2021    ALKPHOS 77 08/13/2021    AST 55 (H) 08/13/2021    ALT 71 (H) 08/13/2021    LABGLOM >60.0 08/13/2021    GFRAA >60.0 08/13/2021    GLOB 2.9 08/13/2021     Magnesium:   Lab Results   Component Value Date    MG 2.3 07/11/2013     Troponin:   Lab Results   Component Value Date    TROPONINI 0.007 07/12/2013     PT/INR:   Recent Labs     08/13/21  0640   PROTIME 14.8   INR 1.2     APTT: No results for input(s): APTT in the last 72 hours. EtOH:     RADIOLOGY: (Personally reviewed)  CT scans pending      ASSESSMENT:  Poornima Hilario is a 78 y.o. female s/p fall from 2nd story     Trauma workup found:  C7 endplate fx  T2 endplate w/ extension into lamina into the spinous process      PLAN:  Neurosurgery consulted  Maintain C spine precautions with Aspen collor  Rest of the CT scans to be followed up by Dr. Dionicio Sorenson who I signed this patient out to. Dispo pending final CT scan reads.          [address] [place] [address] MD Skip  Trauma/Critical Care/General Surgery  [phone] ([phone])  [phone]

## 2021-08-13 NOTE — ED NOTES
Pt returned from MRI at this time.  Family remains at bedside at this time     Mark Cook RN  08/13/21 1847

## 2021-08-13 NOTE — ED NOTES
Family at bedside concerned with the fentanyl ordered due to the reaction that the pt has had with previous pain medications. Family states that she becomes nauseous from them. Updated Dr. Carmen Church and stated ok to give 25 mcg of fentanyl and zofran.      Syl Rizzo RN  08/13/21 3117

## 2021-08-13 NOTE — CONSULTS
Spiritual Care Services     Summary of Visit  Nurse called  to patient's for a patient in rapid decline. Family present and grieving in a healthy manner.  provided prayer and support. Spiritual Assessment/Intervention/Outcomes:    Encounter Summary  Services provided to[de-identified] Patient and family together  Referral/Consult From[de-identified] Nurse  Support System: Children, Family members  Continue Visiting: Yes  Complexity of Encounter: High  Length of Encounter: 30 minutes  Spiritual Assessment Completed: Yes  Routine  Type: Initial        Sacraments  Sacrament of Sick-Anointing: Patient requested anointing  Grief and Life Adjustment  Type: End of life  Assessment: Tearful, Grieving  Intervention: Active listening, Prayer, Ritual, Grief care, End of life care  Outcome: Coping              Values / Beliefs  Do you have any ethnic, cultural, sacramental, or spiritual [employer] needs you would like us to be aware of while you are in the hospital?: Yes    Care Plan:        [id number]   Electronically signed by Kevin Yu on 8/13/21 at 7:03 PM EDT     To reach a  for emotional and spiritual support, place an [facility]'S \[facility] [place] [facility]\"" consult request.   If a  is needed immediately, dial 0 and ask to page the on-call .

## 2021-08-14 NOTE — DISCHARGE SUMMARY
Physician Discharge Summary     Patient ID:  Laveta Sicard  [id number]  74 y.o.  1940    Admit date: 8/13/2021    Discharge date : 08/13/2021    Admitting Physician: King Matos DO     Discharge Physician: King Matos DO     Admission Diagnoses: Acute CVA (cerebrovascular accident) [facility]) [I63.9]    Discharge Diagnoses: Acute CVA    Admission Condition: poor    Discharged Condition: poor    Hospital Course: 78 y.o. female with significant past medical history of dementia, hypothyroid and RA who presents after a fall out of a second story window >10 ft to the ground. Pt lives with her daughter and is normally able to ambulate and function on her own. The fall was witnessed by a neighbor and the daughter heard the noise and her mom complaining of arm pain. There was not initially alteration in mental status. She was brought to the ED as a Level 1 trauma. Workup in the ED showed multiple vertebral fractures at T1, T2, T7, L1, sacral and L pubic rami fractures. These were evaluated by trauma, ortho and neurosurgery and all agreed that none were operable. CT head was negative and labs were largely unremarkable. Pt was alert at this time. Plan was to admit to the trauma service, but while in the ED, patient became acutely obtunded. Repeat CT head was negative for bleeding. Neurology was contacted from the ED and STAT MRI was ordered and showed multiple bilateral frontal lobe infarcts. Pt with abnormal breathing and Dr. Gerri Martinez recommended intubating the patient for airway protection, but family states patient did not want that and decided on comfort measures. Hospice was brought up and family requested that she be seen by neurology for prognosis prior to deciding on hospice. Pt made DNR-CC. Trauma signed off and patient admitted to the hospitalist service to the floor and Neuro consulted. Neuro spoke with family who decided on hospice.  Hospice consulted and accepted patient the same day and patient discharged to inpatient hospice. Prognosis extremely poor. Consults: neurology      Discharge Exam:  See daily progress note    Labs:   Recent Labs     08/13/21  0640 08/13/21  0803   WBC 10.6  --    HGB 11.6* 10.9*   HCT 33.7*  --      --      Recent Labs     08/13/21  0639 08/13/21  0803     --    K 3.5  --      --    CO2 24  --    BUN 13  --    CREATININE 0.61 0.9   CALCIUM 9.4  --      Recent Labs     08/13/21  0639   AST 55*   ALT 71*   BILITOT 0.9*   ALKPHOS 77     Recent Labs     08/13/21  0640   INR 1.2     No results for input(s): Arianna Brown in the last 72 hours. Urinalysis:   Lab Results   Component Value Date    NITRU Negative 11/08/2019    WBCUA 0-2 11/08/2019    BACTERIA Negative 11/08/2019    RBCUA 3-5 11/08/2019    BLOODU TRACE 11/08/2019    SPECGRAV 1.008 11/08/2019    GLUCOSEU Negative 11/08/2019       Radiology:   Most recent    Chest CT      WITH CONTRAST:Results for orders placed during the hospital encounter of 08/13/21    CT CHEST W CONTRAST    Narrative  CT CHEST W CONTRAST, CT ABDOMEN PELVIS W IV CONTRAST, CT LUMBAR SPINE WO CONTRAST, CT THORACIC SPINE WO CONTRAST : 8/13/2021    CLINICAL HISTORY:  fall > 10ft . COMPARISON: None available. TECHNIQUE: Spiral images were obtained of the chest, abdomen and pelvis after the uneventful intravenous administration of approximately 100 mL of Isovue-370 contrast.    Spiral imaging of the thoracic and lumbar spine were also obtained. Routine multiplanar reconstructions were performed for each study. All CT scans at this facility use dose modulation, iterative reconstruction, and/or weight based dosing when appropriate to reduce radiation dose to as low as reasonably achievable. CHEST CT FINDINGS:    Thoracic spine fractures are noted, as described in the thoracic spine study. Mild to moderate changes probable dependent atelectasis is present.     There are no displaced rib fractures, significant hematoma, pulmonary contusion, pneumothorax, pleural or pericardial effusion. ABDOMEN AND PELVIS CT FINDINGS:    Mildly displaced predominantly horizontal fractures are present of the mid distal sacrum. Nondisplaced fractures are present of the mid left inferior pubic ramus and junction of the left superior and acetabulum, without extension to the articular surface. Mild to moderate ill-defined density within the subcutaneous fat posterior to the left ischial tuberosity is consistent with a small posttraumatic hematoma/ecchymosis. There is no evidence of solid organ injury or free fluid. A healed right femoral neck fracture with screws is noted. THORACIC SPINE CT FINDINGS:    A mildly displaced small avulsion fracture is identified of the posterior inferior T1 spinous process. A mild compression fracture of T2 is present, with nondisplaced horizontal extension into the lamina and base of the spinous process. A minimally depressed fracture of the superior endplate of T7 is probably acute. A mild compression fracture of T3 1 be old and healed. There is no dislocation, or other acute fractures identified. LUMBAR SPINE CT FINDINGS:    A minimally depressed fracture of the superior endplate of L1 is probably acute. Mildly displaced fractures of the mid to distal sacrum are noted    There is no dislocation or other fractures identified. Mild to moderate degenerative changes are present, predominantly of the thoracolumbar junction. Impression  FINAL IMPRESSION:    ACUTE T1, T2, T7, L1, SACRAL AND LEFT PUBIC RAMI FRACTURES, AS DESCRIBED. PROBABLY CHRONIC HEALED MILD T3 COMPRESSION FRACTURE. SMALL SUBCUTANEOUS FAT LEFT BUTTOCK HEMATOMA/ECCHYMOSIS    NO EVIDENCE OF SOLID ORGAN INJURY OR OTHER POSTTRAUMATIC COMPLICATION IDENTIFIED. WITHOUT CONTRAST: No results found for this or any previous visit.       CXR      2-view: Results for orders placed in visit on 01/19/17    XR CHEST STANDARD TWO VW    Narrative  EXAMINATION: CHEST X-RAY, PA AND LATERAL    CLINICAL HISTORY: UPPER RESPIRATORY CONGESTION, COUGH, PNEUMONIA    COMPARISONS: 7/11/2013    FINDINGS: Heart and mediastinum appear normal. There is hyperinflation lungs compatible with COPD. There is bilateral minimal apical pleural and/or parenchymal scarring. There are no acute pulmonary infiltrates or pleural effusions. There is osteopenia  and mild degenerative bone spurring in the spine. Impression  1. NO ACUTE PULMONARY INFILTRATES OR PLEURAL EFFUSIONS. 2. HYPERINFLATION OF THE LUNGS COMPATIBLE WITH COPD. 3. NO SIGNIFICANT INTERVAL CHANGE SINCE THE 7/11/2013 PORTABLE CHEST X-RAY. Portable: Results for orders placed during the hospital encounter of 08/13/21    XR CHEST PORTABLE    Narrative  EXAMINATION: XR CHEST PORTABLE    CLINICAL HISTORY: 78YEAR-OLD. FELL FROM SECOND-STORY WINDOW. COMPARISONS: JANUARY 19, 2017    FINDINGS: Osseous structures intact. Cardiopericardial silhouette normal. Lungs clear. Impression  NO ACUTE CARDIOPULMONARY DISEASE. Echo No results found for this or any previous visit. Disposition: hospice    In process/preliminary results:  Outstanding Order Results       No orders found for last 30 day(s).             Patient Instructions:   Discharge Medication List as of 8/13/2021 10:28 PM        CONTINUE these medications which have NOT CHANGED    Details   risperiDONE (RISPERDAL) 0.5 MG tablet Take 1 tablet by mouth every evening, Disp-30 tablet, R-2Normal      flecainide (TAMBOCOR) 50 MG tablet Take 1 tablet by mouth 2 times daily, Disp-180 tablet, R-3Normal      Multiple Vitamin (MULTI-DAY PO) Take by mouthHistorical Med      aspirin 81 MG tablet Take 81 mg by mouth dailyHistorical Med      Calcium Carbonate-Vitamin D (OYSTER SHELL CALCIUM/D) 500-200 MG-UNIT TABS Take 1 tablet by mouthHistorical Med             DC time 35 minutes    Signed:  Electronically signed by Kb Preston DO on 8/14/2021 at 10:43 AM

## 2021-08-16 NOTE — CARE COORDINATION
[facility] Update     2021    Patient: Kishor Biswas Patient : 1940   MRN: [id number]  Reason for Admission: 2021 CVA  Discharge Date: 21 RARS: Readmission Risk Score: 10  CT       Care Transitions Post Acute Facility Update    Care Transitions Interventions  Post Acute Facility: [facility] Update